# Patient Record
Sex: FEMALE | Race: WHITE | Employment: FULL TIME | URBAN - METROPOLITAN AREA
[De-identification: names, ages, dates, MRNs, and addresses within clinical notes are randomized per-mention and may not be internally consistent; named-entity substitution may affect disease eponyms.]

---

## 2018-06-26 ENCOUNTER — OFFICE VISIT (OUTPATIENT)
Dept: OBGYN CLINIC | Age: 23
End: 2018-06-26

## 2018-06-26 VITALS
WEIGHT: 124 LBS | SYSTOLIC BLOOD PRESSURE: 102 MMHG | BODY MASS INDEX: 22.82 KG/M2 | HEIGHT: 62 IN | DIASTOLIC BLOOD PRESSURE: 70 MMHG

## 2018-06-26 DIAGNOSIS — Z11.3 SCREENING FOR VENEREAL DISEASE: ICD-10-CM

## 2018-06-26 DIAGNOSIS — N64.4 BREAST PAIN, RIGHT: ICD-10-CM

## 2018-06-26 DIAGNOSIS — Z01.419 ENCOUNTER FOR GYNECOLOGICAL EXAMINATION (GENERAL) (ROUTINE) WITHOUT ABNORMAL FINDINGS: Primary | ICD-10-CM

## 2018-06-26 NOTE — PROGRESS NOTES
[address] [facility]  [email or web address]  [phone]    Umang Patricio MD, FACOG       Annual Gynecologic Exam:  [facility] <40  Chief Complaint   Patient presents with    Well Woman    Breast pain     right         Matt Marquez is a 21 y.o.  WHITE OR  female who presents for an annual well woman exam.  Patient's last menstrual period was 2018 (exact date). .    With regard to the Gardisil vaccine, she declines to receive it. She does not report additional concerns today. Pt c/o right breast pain, persistent x 2-3 mos.  +caffeine 5 cups coffee per day. No FH breast cancer or previous breast problems. Menstrual status:  Her periods are moderate. She does not report dysmenorrhea/painful menses. She does not report irregular bleeding. Sexual history and Contraception:  History   Sexual Activity    Sexual activity: Yes    Partners: Male    Birth control/ protection: Condom     She always use condoms with sexual activity  She does reports new sexual partner(s) in the last year. The patient does not request STD testing. We recommended testing per CDC guidelines and at patient request.     Preventive Medicine History:  Her most recent Pap smear result: normal was obtained in 2016  Her most recent HR HPV screen was N/A. She does not have a history of FAY 2, 3 or cervical cancer. Past Medical History:   Diagnosis Date    Pap smear for cervical cancer screening 2016    Negative     OB History    Para Term  AB Living   0 0 0 0 0 0   SAB TAB Ectopic Molar Multiple Live Births   0 0 0  0            No past surgical history on file.   Family History   Problem Relation Age of Onset    Diabetes Father     Hypertension Father     No Known Problems Mother      Social History     Social History    Marital status: SINGLE     Spouse name: N/A    Number of children: N/A    Years of education: N/A     Occupational History    Not on file.     Social History Main Topics    Smoking status: Never Smoker    Smokeless tobacco: Not on file    Alcohol use No    Drug use: No    Sexual activity: Yes     Partners: Male     Birth control/ protection: Condom     Other Topics Concern    Not on file     Social History Narrative       No Known Allergies    No current outpatient prescriptions on file. No current facility-administered medications for this visit.         Patient Active Problem List   Diagnosis Code    Dysmenorrhea N94.6       Review of Systems - History obtained from the patient  Constitutional: negative for weight loss, fever, night sweats  HEENT: negative for hearing loss, earache, congestion, snoring, sorethroat  CV: negative for chest pain, palpitations, edema  Resp: negative for cough, shortness of breath, wheezing  GI: negative for change in bowel habits, abdominal pain, black or bloody stools  : negative for frequency, dysuria, hematuria  GYN: see HPI  MSK: negative for back pain, joint pain, muscle pain  Breast: see hpi  Skin :negative for itching, rash, hives  Neuro: negative for dizziness, headache, confusion, weakness  Psych: negative for anxiety, depression, change in mood  Heme/lymph: negative for bleeding, bruising, pallor    Physical Exam  Visit Vitals    /70    Ht 5' 2\" (1.575 m)    Wt 124 lb (56.2 kg)    LMP 05/31/2018 (Exact Date)    BMI 22.68 kg/m2       Constitutional  · Appearance: well-nourished, well developed, alert, in no acute distress    HENT  · Head and Face: appears normal    Neck  · Inspection/Palpation: normal appearance, no masses or tenderness  · Lymph Nodes: no lymphadenopathy present  · Thyroid: gland size normal, nontender, no nodules or masses present on palpation    Chest  · Respiratory Effort: breathing unlabored  · Auscultation: normal breath sounds    Cardiovascular  · Heart:  · Auscultation: regular rate and rhythm without murmur    Breasts  · Inspection of Breasts: breasts symmetrical, no skin changes, no discharge present, nipple appearance normal, no skin retraction present  · Palpation of Breasts and Axillae: no masses present on palpation, no breast tenderness  · Axillary Lymph Nodes: no lymphadenopathy present    Gastrointestinal  · Abdominal Examination: abdomen non-tender to palpation, normal bowel sounds, no masses present  · Liver and spleen: no hepatomegaly present, spleen not palpable  · Hernias: no hernias identified    Genitourinary  · External Genitalia: normal appearance for age, no discharge present, no tenderness present, no inflammatory lesions present, no masses present  · Vagina: normal vaginal vault without central or paravaginal defects, no discharge present, no inflammatory lesions present, no masses present  · Bladder: non-tender to palpation  · Urethra: appears normal  · Cervix: normal   · Uterus: normal size, shape and consistency  · Adnexa: no adnexal tenderness present, no adnexal masses present  · Perineum: perineum within normal limits, no evidence of trauma, no rashes or skin lesions present  · Anus: anus within normal limits, no hemorrhoids present  · Inguinal Lymph Nodes: no lymphadenopathy present    Skin  · General Inspection: no rash, no lesions identified    Neurologic/Psychiatric  · Mental Status:  · Orientation: grossly oriented to person, place and time  · Mood and Affect: mood normal, affect appropriate    Assessment:  21 y.o.  for well woman exam  Encounter Diagnoses   Name Primary?  Encounter for gynecological examination (general) (routine) without abnormal findings Yes    Screening for venereal disease     Breast pain, right        Plan:  The patient was counseled about diet, exercise, healthy lifestyle  We discussed self breast exam  We discussed safer sex practices, condom use and risk factors for sexually transmitted diseases. We discussed current pap smear and HR HPV testing guidelines.    We recommend follow up one year for routine annual gynecologic exam or sooner prn  We recommend routine follow up with her primary care doctor for management of chronic medical problems and non-gynecologic concerns  Handouts were given to the patient  We discussed calcium/vitamin D/weight bearing exercise and osteoporosis prevention  Breast us  Disc options for breast pain,   Fu if ni    Folllow up:  [x] return for annual well woman exam in one year or sooner if she is having problems  [] follow up and ultrasound  [] 6 months  [] 3 months  [] 6 weeks   [] 1 month    Orders Placed This Encounter    US BREAST RT COMPLETE 4 QUAD    CHLAMYDIA/GC PCR       No results found for any visits on 06/26/18.

## 2018-06-26 NOTE — MR AVS SNAPSHOT
[address] [facility] [address] [place] [phone] Patient: Sherrie Dunn MRN: [id number] :1995 Visit Information Date & Time Provider Department Dept. Phone Encounter #  
 2018  8:40 AM Collette Bair, MD [facility] [phone] [id number] Upcoming Health Maintenance Date Due  
 HPV Age 9Y-34Y (1 of 1 - Female 3 Dose Series) 2006 Influenza Age 5 to Adult 2018 PAP AKA CERVICAL CYTOLOGY 2019 Allergies as of 2018  Review Complete On: 2018 By: Hai Ocampo LPN No Known Allergies Current Immunizations  Never Reviewed No immunizations on file. Not reviewed this visit Vitals BP Height(growth percentile) Weight(growth percentile) LMP BMI OB Status 102/70 5' 2\" (1.575 m) 124 lb (56.2 kg) 2018 (Exact Date) 22.68 kg/m2 Having regular periods Smoking Status Never Smoker BMI and BSA Data Body Mass Index Body Surface Area  
 22.68 kg/m 2 1.57 m 2 Preferred Pharmacy Pharmacy Name Phone [id number] [address] [facility] [phone] Your Updated Medication List  
  
   
This list is accurate as of 18  8:57 AM.  Always use your most recent med list.  
  
  
  
  
 norethindrone-ethinyl estradiol 1 mg-20 mcg (21)/75 mg (7) Tab Commonly known as:  Marika SCHROEDER  Take 1 Tab by mouth daily. Patient Instructions Well Visit, Ages 25 to 48: Care Instructions Your Care Instructions Physical exams can help you stay healthy. Your doctor has checked your overall health and may have suggested ways to take good care of yourself. He or she also may have recommended tests. At home, you can help prevent illness with healthy eating, regular exercise, and other steps. Follow-up care is a key part of your treatment and safety. Be sure to make and go to all appointments, and call your doctor if you are having problems. It's also a good idea to know your test results and keep a list of the medicines you take. How can you care for yourself at home? · Reach and stay at a healthy weight. This will lower your risk for many problems, such as obesity, diabetes, heart disease, and high blood pressure. · Get at least 30 minutes of physical activity on most days of the week. Walking is a good choice. You also may want to do other activities, such as running, swimming, cycling, or playing tennis or team sports. Discuss any changes in your exercise program with your doctor. · Do not smoke or allow others to smoke around you. If you need help quitting, talk to your doctor about stop-smoking programs and medicines. These can increase your chances of quitting for good. · Talk to your doctor about whether you have any risk factors for sexually transmitted infections (STIs). Having one sex partner (who does not have STIs and does not have sex with anyone else) is a good way to avoid these infections. · Use birth control if you do not want to have children at this time. Talk with your doctor about the choices available and what might be best for you. · Protect your skin from too much sun. When you're outdoors from 10 a.m. to 4 p.m., stay in the shade or cover up with clothing and a hat with a wide brim. Wear sunglasses that block UV rays. Even when it's cloudy, put broad-spectrum sunscreen (SPF 30 or higher) on any exposed skin. · See a dentist one or two times a year for checkups and to have your teeth cleaned. · Wear a seat belt in the car. · Drink alcohol in moderation, if at all. That means no more than 2 drinks a day for men and 1 drink a day for women. Follow your doctor's advice about when to have certain tests. These tests can spot problems early. For everyone · Cholesterol. Have the fat (cholesterol) in your blood tested after age 21. Your doctor will tell you how often to have this done based on your age, family history, or other things that can increase your risk for heart disease. · Blood pressure. Have your blood pressure checked during a routine doctor visit. Your doctor will tell you how often to check your blood pressure based on your age, your blood pressure results, and other factors. · Vision. Talk with your doctor about how often to have a glaucoma test. 
· Diabetes. Ask your doctor whether you should have tests for diabetes. · Colon cancer. Have a test for colon cancer at age 48. You may have one of several tests. If you are younger than 48, you may need a test earlier if you have any risk factors. Risk factors include whether you already had a precancerous polyp removed from your colon or whether your parent, brother, sister, or child has had colon cancer. For women · Breast exam and mammogram. Talk to your doctor about when you should have a clinical breast exam and a mammogram. Medical experts differ on whether and how often women under 50 should have these tests. Your doctor can help you decide what is right for you. · Pap test and pelvic exam. Begin Pap tests at age 24. A Pap test is the best way to find cervical cancer. The test often is part of a pelvic exam. Ask how often to have this test. 
· Tests for sexually transmitted infections (STIs). Ask whether you should have tests for STIs. You may be at risk if you have sex with more than one person, especially if your partners do not wear condoms. For men · Tests for sexually transmitted infections (STIs). Ask whether you should have tests for STIs. You may be at risk if you have sex with more than one person, especially if you do not wear a condom. · Testicular cancer exam. Ask your doctor whether you should check your testicles regularly. · Prostate exam. Talk to your doctor about whether you should have a blood test (called a PSA test) for prostate cancer. Experts differ on whether and when men should have this test. Some experts suggest it if you are older than 39 and are -American or have a father or brother who got prostate cancer when he was younger than 72. When should you call for help? Watch closely for changes in your health, and be sure to contact your doctor if you have any problems or symptoms that concern you. Where can you learn more? Go to http://bertrand-ollie.info/. Enter P072 in the search box to learn more about \"Well Visit, Ages 25 to 48: Care Instructions. \" Current as of: May 12, 2017 Content Version: 11.4 © 2867-9331 [employer]. Care instructions adapted under license by [employer] (which disclaims liability or warranty for this information). If you have questions about a medical condition or this instruction, always ask your healthcare professional. [place] [address] any warranty or liability for your use of this information. Introducing \[facility] [place] [facility]\"" & HEALTH SERVICES! Dear Naya Isbell: 
Thank you for requesting a bizsol account. Our records indicate that you already have an active bizsol account. You can access your account anytime at [email or web address] Did you know that you can access your hospital and ER discharge instructions at any time in bizsol? You can also review all of your test results from your hospital stay or ER visit. Additional Information If you have questions, please visit the Frequently Asked Questions section of the bizsol website at [email or web address]. Remember, bizsol is NOT to be used for urgent needs. For medical emergencies, dial 911. Now available from your iPhone and Android! Please provide this summary of care documentation to your next provider. If you have any questions after today's visit, please call [phone].

## 2018-06-26 NOTE — PATIENT INSTRUCTIONS

## 2018-06-28 LAB
C TRACH RRNA SPEC QL NAA+PROBE: NEGATIVE
N GONORRHOEA RRNA SPEC QL NAA+PROBE: NEGATIVE

## 2018-06-29 ENCOUNTER — HOSPITAL ENCOUNTER (OUTPATIENT)
Dept: MAMMOGRAPHY | Age: 23
Discharge: HOME | End: 2018-06-29
Attending: OBSTETRICS & GYNECOLOGY
Payer: COMMERCIAL

## 2018-06-29 DIAGNOSIS — N64.4 BREAST PAIN, RIGHT: ICD-10-CM

## 2018-06-29 PROCEDURE — 76642 ULTRASOUND BREAST LIMITED: CPT

## 2018-07-03 NOTE — PROGRESS NOTES
The results are normal.   Please notify patient. Recommend f/u if still having symptoms/problems or has additional concerns.   Breast US wnl  rec breast fu 2-3 mos check sx

## 2018-07-18 ENCOUNTER — TELEPHONE (OUTPATIENT)
Dept: OBGYN CLINIC | Age: 23
End: 2018-07-18

## 2018-07-18 NOTE — TELEPHONE ENCOUNTER
Notes Recorded by Kenia Knight on 7/18/2018 at 12:13 PM  Left message on machine for patient to call the office or to respond to the [facility] message sent regarding normal breast ultrasound.  ------    Notes Recorded by Alda Moss MD on 7/3/2018 at 6:07 PM  The results are normal.   Please notify patient. Recommend f/u if still having symptoms/problems or has additional concerns.   Breast US wnl  rec breast fu 2-3 mos check sx      Pt retuning call and was notified with verbal understanding she will call back to schedule an appt

## 2018-07-18 NOTE — PROGRESS NOTES
Left message on machine for patient to call the office or to respond to the [employer] message sent regarding normal breast ultrasound.

## 2018-12-22 ENCOUNTER — APPOINTMENT (OUTPATIENT)
Dept: CT IMAGING | Age: 23
End: 2018-12-22
Attending: STUDENT IN AN ORGANIZED HEALTH CARE EDUCATION/TRAINING PROGRAM
Payer: COMMERCIAL

## 2018-12-22 ENCOUNTER — HOSPITAL ENCOUNTER (EMERGENCY)
Age: 23
Discharge: HOME | End: 2018-12-22
Attending: EMERGENCY MEDICINE
Payer: COMMERCIAL

## 2018-12-22 VITALS
DIASTOLIC BLOOD PRESSURE: 72 MMHG | SYSTOLIC BLOOD PRESSURE: 117 MMHG | OXYGEN SATURATION: 100 % | BODY MASS INDEX: 20.38 KG/M2 | RESPIRATION RATE: 18 BRPM | TEMPERATURE: 98.4 F | HEIGHT: 63 IN | HEART RATE: 89 BPM | WEIGHT: 115 LBS

## 2018-12-22 DIAGNOSIS — S16.1XXA STRAIN OF NECK MUSCLE, INITIAL ENCOUNTER: ICD-10-CM

## 2018-12-22 DIAGNOSIS — S06.0X0A CONCUSSION WITHOUT LOSS OF CONSCIOUSNESS, INITIAL ENCOUNTER: Primary | ICD-10-CM

## 2018-12-22 PROCEDURE — 70450 CT HEAD/BRAIN W/O DYE: CPT

## 2018-12-22 PROCEDURE — 99282 EMERGENCY DEPT VISIT SF MDM: CPT

## 2018-12-22 PROCEDURE — 74011250637 HC RX REV CODE- 250/637: Performed by: STUDENT IN AN ORGANIZED HEALTH CARE EDUCATION/TRAINING PROGRAM

## 2018-12-22 RX ORDER — BUTALBITAL, ACETAMINOPHEN AND CAFFEINE 50; 325; 40 MG/1; MG/1; MG/1
1 TABLET ORAL ONCE
Status: COMPLETED | OUTPATIENT
Start: 2018-12-22 | End: 2018-12-22

## 2018-12-22 RX ADMIN — BUTALBITAL, ACETAMINOPHEN AND CAFFEINE 1 TABLET: 50; 325; 40 TABLET ORAL at 22:44
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
## 2018-12-23 NOTE — DISCHARGE INSTRUCTIONS
Concussion: Care Instructions  Your Care Instructions    A concussion is a kind of injury to the brain. It happens when the head receives a hard blow. The impact can jar or shake the brain against the skull. This interrupts the brain's normal activities. Although you may have cuts or bruises on your head or face, you may have no other visible signs of a brain injury. In most cases, damage to the brain from a concussion can't be seen in tests such as a CT or MRI scan. For a few weeks, you may have low energy, dizziness, trouble sleeping, a headache, ringing in your ears, or nausea. You may also feel anxious, grumpy, or depressed. You may have problems with memory and concentration. These symptoms are common after a concussion. They should slowly improve over time. Sometimes this takes weeks or even months. Someone who lives with you should know how to care for you. Please share this and all information with a caregiver who will be available to help if needed. Follow-up care is a key part of your treatment and safety. Be sure to make and go to all appointments, and call your doctor if you are having problems. It's also a good idea to know your test results and keep a list of the medicines you take. How can you care for yourself at home? Pain control  · Put ice or a cold pack on the part of your head that hurts for 10 to 20 minutes at a time. Put a thin cloth between the ice and your skin. · Be safe with medicines. Read and follow all instructions on the label. ? If the doctor gave you a prescription medicine for pain, take it as prescribed. ? If you are not taking a prescription pain medicine, ask your doctor if you can take an over-the-counter medicine. Recovery  · Follow your doctor's instructions. He or she will tell you if you need someone to watch you closely for the next 24 hours or longer. · Rest is the best way to recover from a concussion.  You need to rest your body and your brain:  ? Get plenty of sleep at night. And take rest breaks during the day. ? Avoid activities that take a lot of physical or mental work. This includes housework, exercise, schoolwork, video games, text messaging, and using the computer. ? You may need to change your school or work schedule while you recover. ? Return to your normal activities slowly. Do not try to do too much at once. · Do not drink alcohol or use illegal drugs. Alcohol and illegal drugs can slow your recovery. And they can increase your risk of a second brain injury. · Avoid activities that could lead to another concussion. Follow your doctor's instructions for a gradual return to activity and sports. · Ask your doctor when it's okay for you to drive a car, ride a bike, or operate machinery. How should you return to activity? Your return to activity can begin after 1 to 2 days of physical and mental rest. After resting, you can gradually increase your activity as long as it does not cause new symptoms or worsen your symptoms. Doctors and concussion specialists suggest steps to follow for returning to sports after a concussion. Use these steps as a guide. You should slowly progress through the following levels of activity:  1. Limited activity. You can take part in daily activities as long as the activity doesn't increase your symptoms or cause new symptoms. 2. Light aerobic activity. This can include walking, swimming, or other exercise at less than 70% of maximum heart rate. No resistance training is included in this step. 3. Sport-specific exercise. This includes running drills or skating drills (depending on the sport), but no head impact. 4. Noncontact training drills. This includes more complex training drills such as passing. The athlete may also begin light resistance training. 5. Full-contact practice. The athlete can participate in normal training. 6. Return to normal game play.  This is the final step and allows the athlete to join in normal game play. Watch and keep track of your progress. It should take at least 6 days for you to go from light activity to normal game play. Make sure that you can stay at each new level of activity for at least 24 hours without symptoms, or as long as your doctor says, before doing more. If one or more symptoms come back, return to a lower level of activity for at least 24 hours. Don't move on until all symptoms are gone. When should you call for help? Call 911 anytime you think you may need emergency care. For example, call if:    · You have a seizure.     · You passed out (lost consciousness).     · You are confused or can't stay awake.    Call your doctor now or seek immediate medical care if:    · You have new or worse vomiting.     · You feel less alert.     · You have new weakness or numbness in any part of your body.    Watch closely for changes in your health, and be sure to contact your doctor if:    · You do not get better as expected.     · You have new symptoms, such as headaches, trouble concentrating, or changes in mood. Where can you learn more? Go to http://bertrand-ollie.info/. Enter Z317 in the search box to learn more about \"Concussion: Care Instructions. \"  Current as of: September 10, 2017  Content Version: 11.8  © 8077-0827 [employer], [employer]. Care instructions adapted under license by [employer] (which disclaims liability or warranty for this information). If you have questions about a medical condition or this instruction, always ask your healthcare professional. [place] [address] any warranty or liability for your use of this information. Seen and examined . D/W Nurse and AId from [facility]. Neurology consult noted.
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
## 2021-02-23 ENCOUNTER — OFFICE VISIT (OUTPATIENT)
Dept: OBGYN CLINIC | Age: 26
End: 2021-02-23
Payer: COMMERCIAL

## 2021-02-23 VITALS
DIASTOLIC BLOOD PRESSURE: 88 MMHG | BODY MASS INDEX: 24.8 KG/M2 | SYSTOLIC BLOOD PRESSURE: 127 MMHG | HEIGHT: 63 IN | WEIGHT: 140 LBS | HEART RATE: 104 BPM

## 2021-02-23 DIAGNOSIS — Z01.419 ENCOUNTER FOR GYNECOLOGICAL EXAMINATION (GENERAL) (ROUTINE) WITHOUT ABNORMAL FINDINGS: ICD-10-CM

## 2021-02-23 DIAGNOSIS — Z12.4 ENCOUNTER FOR PAPANICOLAOU SMEAR FOR CERVICAL CANCER SCREENING: Primary | ICD-10-CM

## 2021-02-23 DIAGNOSIS — N94.6 DYSMENORRHEA: ICD-10-CM

## 2021-02-23 PROCEDURE — 99395 PREV VISIT EST AGE 18-39: CPT | Performed by: OBSTETRICS & GYNECOLOGY

## 2021-02-23 NOTE — PROGRESS NOTES
[facility]  [email or web address]  [phone]    Symone Vargas MD, FACOG       Annual Gynecologic Exam:  [facility] <40  Chief Complaint   Patient presents with    Well Woman         Concepción Anderson is a 22 y.o.  WHITE female who presents for an annual well woman exam.  Patient's last menstrual period was 01/15/2021 (exact date). .    She reports the following additional concerns: Wants information about IUD. Menstrual status:  She does report dysmenorrhea/painful menses. She does report heavy menses. She does not report irregular bleeding. Sexual history and Contraception:  Social History     Substance and Sexual Activity   Sexual Activity Not Currently    Partners: Male    Birth control/protection: Condom       She does reports new sexual partner(s) in the last year. Preventive Medicine History:  Her most recent Pap smear result: normal was obtained in 2016  Her most recent HR HPV screen was never obtained. She does not have a history of FAY 2, 3 or cervical cancer. Past Medical History:   Diagnosis Date    Pap smear for cervical cancer screening 2016    Negative     OB History    Para Term  AB Living   0 0 0 0 0 0   SAB TAB Ectopic Molar Multiple Live Births   0 0 0   0       History reviewed. No pertinent surgical history.   Family History   Problem Relation Age of Onset    Diabetes Father     Hypertension Father     No Known Problems Mother      Social History     Socioeconomic History    Marital status: SINGLE     Spouse name: Not on file    Number of children: Not on file    Years of education: Not on file    Highest education level: Not on file   Occupational History    Not on file   Social Needs    Financial resource strain: Not on file    Food insecurity     Worry: Not on file     Inability: Not on file    Transportation needs     Medical: Not on file     Non-medical: Not on file   Tobacco Use    Smoking status: Never Smoker    Smokeless tobacco: Never Used   Substance and Sexual Activity    Alcohol use: Yes     Alcohol/week: 1.0 standard drinks     Types: 1 Glasses of wine per week    Drug use: No    Sexual activity: Not Currently     Partners: Male     Birth control/protection: Condom   Lifestyle    Physical activity     Days per week: Not on file     Minutes per session: Not on file    Stress: Not on file   Relationships    Social connections     Talks on phone: Not on file     Gets together: Not on file     Attends Sabianism service: Not on file     Active member of club or organization: Not on file     Attends meetings of clubs or organizations: Not on file     Relationship status: Not on file    Intimate partner violence     Fear of current or ex partner: Not on file     Emotionally abused: Not on file     Physically abused: Not on file     Forced sexual activity: Not on file   Other Topics Concern    Not on file   Social History Narrative    Not on file       No Known Allergies    No current outpatient medications on file. No current facility-administered medications for this visit.         Patient Active Problem List   Diagnosis Code    Dysmenorrhea N94.6         Review of Systems - History obtained from the patient and patient filled out questionnaire   Constitutional/general, HEENT, CV, Resp, GI, MSK, Neuro, Psych, Heme/lymph, Skin, Breast ROS: no significant complaints except as noted on HPI    Physical Exam  Visit Vitals  /88 (BP 1 Location: Right arm, BP Patient Position: Sitting, BP Cuff Size: Adult)   Pulse (!) 104   Ht 5' 3\" (1.6 m)   Wt 140 lb (63.5 kg)   LMP 01/15/2021 (Exact Date)   BMI 24.80 kg/m²       Constitutional  · Appearance: well-nourished, well developed, alert, in no acute distress    HENT  · Head and Face: appears normal    Neck  · Inspection/Palpation: normal appearance, no masses or tenderness  · Lymph Nodes: no lymphadenopathy present  · Thyroid: gland size normal, nontender, no nodules or masses present on palpation    Chest  · Respiratory Effort: breathing unlabored  · Auscultation: normal breath sounds    Cardiovascular  · Heart:  · Auscultation: regular rate and rhythm without murmur    Breasts  · Inspection of Breasts: breasts symmetrical, no skin changes, no discharge present, nipple appearance normal, no skin retraction present  · Palpation of Breasts and Axillae: no masses present on palpation, no breast tenderness  · Axillary Lymph Nodes: no lymphadenopathy present    Gastrointestinal  · Abdominal Examination: abdomen non-tender to palpation, normal bowel sounds, no masses present  · Liver and spleen: no hepatomegaly present, spleen not palpable  · Hernias: no hernias identified    Genitourinary  · External Genitalia: normal appearance for age, no discharge present, no tenderness present, no inflammatory lesions present, no masses present  · Vagina: normal vaginal vault without central or paravaginal defects, no discharge present, no inflammatory lesions present, no masses present  · Bladder: non-tender to palpation  · Urethra: appears normal  · Cervix: normal   · Uterus: normal size, shape and consistency  · Adnexa: no adnexal tenderness present, no adnexal masses present  · Perineum: perineum within normal limits, no evidence of trauma, no rashes or skin lesions present  · Anus: anus within normal limits, no hemorrhoids present  · Inguinal Lymph Nodes: no lymphadenopathy present    Skin  · General Inspection: no rash, no lesions identified    Neurologic/Psychiatric  · Mental Status:  · Orientation: grossly oriented to person, place and time  · Mood and Affect: mood normal, affect appropriate    Assessment:  22 y.o.  for well woman exam  Encounter Diagnoses   Name Primary?     Encounter for Papanicolaou smear for cervical cancer screening Yes    Encounter for gynecological examination (general) (routine) without abnormal findings     Dysmenorrhea Plan:  The patient was counseled about diet, exercise, healthy lifestyle  We discussed current pap smear and HR HPV testing guidelines. I recommended follow up one year for routine annual gynecologic exam or sooner prn  Handouts were given to the patient  I recommended follow up with a primary care physician for chronic medical problems and evaluation of non-gynecologic concerns and to please contact our office with any GYN questions or concerns. I recommended testing per CDC guidelines and at patient request.   It is preferred to place at the beginning of cycle. Confirm AE up to date, pap up to date, no unprotected intercourse x 2 weeks prior to visit, and that she has checked with her insurance about coverage. Plan [employer] up:  [x] return for annual well woman exam in one year or sooner if she is having problems  [] follow up and ultrasound  [] 6 months  [] 3 months  [] 6 weeks   [] 1 month    Orders Placed This Encounter    PAP IG, CT-NG, RFX APTIMA HPV ASCUS ([id number], [id number])       No results found for any visits on 02/23/21.

## 2021-02-23 NOTE — PATIENT INSTRUCTIONS
Learning About Birth Control: Intrauterine Device (IUD) What is an intrauterine device (IUD)? The intrauterine device (IUD) is used to prevent pregnancy. It's a small, plastic, T-shaped device. Your doctor places the IUD in your uterus. If you and your doctor discuss it before you give birth, this can be done right after you have your baby. You have a choice between a hormonal IUD and a copper IUD. The hormonal IUD can prevent pregnancy for 3 to 6 years, depending on which IUD is used. But your doctor may talk to you about leaving it in for longer. Once you have it, you don't have to do anything else to prevent pregnancy. The copper IUD can prevent pregnancy for 10 years. But your doctor may talk to you about leaving it in for longer. Once you have it, you don't have to do anything else to prevent pregnancy. A string tied to the end of the IUD hangs down through the opening of the uterus (called the cervix) into the vagina. You can check that the IUD is in place by feeling for the string. The IUD usually stays in the uterus until your doctor removes it. How well does it work? In the first year of use: · When the hormonal IUD is used exactly as directed, fewer than 1 woman out of 100 has an unplanned pregnancy. · When the copper IUD is used exactly as directed, fewer than 1 woman out of 100 has an unplanned pregnancy. Be sure to tell your doctor about any health problems you have or medicines you take. He or she can help you choose the birth control method that is right for you. What are the advantages of an IUD? · An IUD is one of the most effective methods of birth control. · It prevents pregnancy for 3 to 10 years, depending on the type. You don't have to worry about birth control during this time. · It's safe to use while breastfeeding. · IUDs don't contain estrogen. So you can use an IUD if you don't want to take estrogen or can't take estrogen because you have certain health problems or concerns. · An IUD is convenient. It is always providing birth control. You don't need to remember to take a pill or get a shot. You don't have to interrupt sex to protect against pregnancy. · A hormonal IUD may reduce heavy bleeding and cramping. What are the disadvantages of an IUD? · An IUD doesn't protect against sexually transmitted infections (STIs), such as herpes or HIV/AIDS. If you aren't sure if your sex partner might have an STI, use a condom to protect against disease. · A copper IUD may cause periods with more bleeding and cramping. · You have to see a doctor to have an IUD inserted and removed. Where can you learn more? Go to http://www.gray.com/ Enter S084 in the search box to learn more about \"Learning About Birth Control: Intrauterine Device (IUD). \" Current as of: February 11, 2020               Content Version: 12.6 © 1660-3573 [employer]. Care instructions adapted under license by [employer] (which disclaims liability or warranty for this information). If you have questions about a medical condition or this instruction, always ask your healthcare professional. [place] [address] any warranty or liability for your use of this information. Well Visit, Ages 25 to 48: Care Instructions Your Care Instructions Physical exams can help you stay healthy. Your doctor has checked your overall health and may have suggested ways to take good care of yourself. He or she also may have recommended tests. At home, you can help prevent illness with healthy eating, regular exercise, and other steps. Follow-up care is a key part of your treatment and safety. Be sure to make and go to all appointments, and call your doctor if you are having problems. It's also a good idea to know your test results and keep a list of the medicines you take. How can you care for yourself at home? · Reach and stay at a healthy weight. This will lower your risk for many problems, such as obesity, diabetes, heart disease, and high blood pressure. · Get at least 30 minutes of physical activity on most days of the week. Walking is a good choice. You also may want to do other activities, such as running, swimming, cycling, or playing tennis or team sports. Discuss any changes in your exercise program with your doctor. · Do not smoke or allow others to smoke around you. If you need help quitting, talk to your doctor about stop-smoking programs and medicines. These can increase your chances of quitting for good. · Talk to your doctor about whether you have any risk factors for sexually transmitted infections (STIs). Having one sex partner (who does not have STIs and does not have sex with anyone else) is a good way to avoid these infections. · Use birth control if you do not want to have children at this time. Talk with your doctor about the choices available and what might be best for you. · Protect your skin from too much sun. When you're outdoors from 10 a.m. to 4 p.m., stay in the shade or cover up with clothing and a hat with a wide brim. Wear sunglasses that block UV rays. Even when it's cloudy, put broad-spectrum sunscreen (SPF 30 or higher) on any exposed skin. · See a dentist one or two times a year for checkups and to have your teeth cleaned. · Wear a seat belt in the car. Follow your doctor's advice about when to have certain tests. These tests can spot problems early. For everyone · Cholesterol. Have the fat (cholesterol) in your blood tested after age 21. Your doctor will tell you how often to have this done based on your age, family history, or other things that can increase your risk for heart disease. · Blood pressure. Have your blood pressure checked during a routine doctor visit. Your doctor will tell you how often to check your blood pressure based on your age, your blood pressure results, and other factors. · Vision. Talk with your doctor about how often to have a glaucoma test. 
· Diabetes. Ask your doctor whether you should have tests for diabetes. · Colon cancer. Your risk for colorectal cancer gets higher as you get older. Some experts say that adults should start regular screening at age 48 and stop at age 76. Others say to start before age 48 or continue after age 76. Talk with your doctor about your risk and when to start and stop screening. For women · Breast exam and mammogram. Talk to your doctor about when you should have a clinical breast exam and a mammogram. Medical experts differ on whether and how often women under 50 should have these tests. Your doctor can help you decide what is right for you. · Cervical cancer screening test and pelvic exam. Begin with a Pap test at age 24. The test often is part of a pelvic exam. Starting at age 27, you may choose to have a Pap test, an HPV test, or both tests at the same time (called co-testing). Talk with your doctor about how often to have testing. · Tests for sexually transmitted infections (STIs). Ask whether you should have tests for STIs. You may be at risk if you have sex with more than one person, especially if your partners do not wear condoms. For men · Tests for sexually transmitted infections (STIs). Ask whether you should have tests for STIs. You may be at risk if you have sex with more than one person, especially if you do not wear a condom. · Testicular cancer exam. Ask your doctor whether you should check your testicles regularly. · Prostate exam. Talk to your doctor about whether you should have a blood test (called a PSA test) for prostate cancer. Experts differ on whether and when men should have this test. Some experts suggest it if you are older than 39 and are -American or have a father or brother who got prostate cancer when he was younger than 72. When should you call for help? Watch closely for changes in your health, and be sure to contact your doctor if you have any problems or symptoms that concern you. Where can you learn more? Go to http://www.armenta.com/ Enter P072 in the search box to learn more about \"Well Visit, Ages 25 to 48: Care Instructions. \" Current as of: May 27, 2020               Content Version: 12.6 © 6801-2064 [employer]. Care instructions adapted under license by [employer] (which disclaims liability or warranty for this information). If you have questions about a medical condition or this instruction, always ask your healthcare professional. [address] any warranty or liability for your use of this information.

## 2021-02-26 LAB
C TRACH RRNA CVX QL NAA+PROBE: NEGATIVE
CYTOLOGIST CVX/VAG CYTO: NORMAL
CYTOLOGY CVX/VAG DOC CYTO: NORMAL
CYTOLOGY CVX/VAG DOC THIN PREP: NORMAL
CYTOLOGY HISTORY:: NORMAL
DX ICD CODE: NORMAL
LABCORP, 190119: NORMAL
Lab: NORMAL
Lab: NORMAL
N GONORRHOEA RRNA CVX QL NAA+PROBE: NEGATIVE
OTHER STN SPEC: NORMAL
STAT OF ADQ CVX/VAG CYTO-IMP: NORMAL

## 2021-02-26 NOTE — PROGRESS NOTES
Normal pap smear, message sent if [address] active. Update PMH/HM: include: Date of pap, Cytology: wnl. For HR HPV results: list NEG or POS, when done.

## 2021-03-18 ENCOUNTER — OFFICE VISIT (OUTPATIENT)
Dept: OBGYN CLINIC | Age: 26
End: 2021-03-18
Payer: COMMERCIAL

## 2021-03-18 VITALS — BODY MASS INDEX: 24.8 KG/M2 | HEIGHT: 63 IN

## 2021-03-18 DIAGNOSIS — Z30.430 ENCOUNTER FOR IUD INSERTION: Primary | ICD-10-CM

## 2021-03-18 DIAGNOSIS — Z76.89 ENCOUNTER FOR MENSTRUAL REGULATION: ICD-10-CM

## 2021-03-18 LAB
HCG URINE, QL. (POC): NEGATIVE
VALID INTERNAL CONTROL?: YES

## 2021-03-18 PROCEDURE — 81025 URINE PREGNANCY TEST: CPT | Performed by: OBSTETRICS & GYNECOLOGY

## 2021-03-18 PROCEDURE — 58300 INSERT INTRAUTERINE DEVICE: CPT | Performed by: OBSTETRICS & GYNECOLOGY

## 2021-03-18 NOTE — PROGRESS NOTES
[address] [place] [address] OB-GYN  [email or web address]/  [phone]    Tad Yao MD, [facility] IUD INSERTION  OFFICE PROCEDURE PROGRESS NOTE    [facility]  OFFICE PROCEDURE PROGRESS NOTE    Chart reviewed for the following:   Aaliyah VOGEL MD, have reviewed the History, Physical and updated the Allergic reactions for [address] performed immediately prior to start of procedure:   Aaliyah VOGEL MD, have performed the following reviews on Henrik Hernandez prior to the start of the procedure:            * Patient was identified by name and date of birth   * Agreement on procedure being performed was verified  * Risks and Benefits explained to the patient  * Procedure site verified and marked as necessary  * Patient was positioned for comfort  * Consent was signed and verified     Time: 3:25pm    Date of procedure: 3/18/2021    Procedure performed by: Aaliyah Hernández MD    Provider assisted by:   Sergei Holman. Patient assisted by: self    How tolerated by patient: tolerated the procedure well with no complications    Post Procedural Pain Scale: 0 - No Hurt    Comments: none        IUD INSERTION  Indications:  Henrik Hernandez is a [de-identified] P5,  22 y.o. female WHITE     LMP: Patient's last menstrual period was 03/17/2021 (exact date). , normal    She has a menstrual history positive for painful & heavy cycles. She  presents for insertion of an IUD. The risks, benefits and alternatives of IUD insertion were discussed in detail and all questions were answered. The patient has reviewed the IUD  provided information and consent. She has elected to proceed with the insertion today and she states she has no further questions. A urine pregnancy test was Negative. Procedure: On bimanual exam the uterus was anteverted and normal in size with no tenderness present.  A speculum was inserted into the vagina and the cervix was visualized. The cervix was prepped with zephiran solution. The anterior lip of the cervix was grasped with a single toothed tenaculum. It was not necessary to dilate the cervix. The uterus was sounded with a pipelle to 7 centimeters and 2cc 1% lidocaine was injected into the cavity. The IUD was then inserted without difficulty. The strings were cut to approximately 3 centimeters and demonstrated to the patient. She experienced a mild  amount of cramping. Post Procedure Status:   She tolerated the procedure with mild discomfort. The patient was observed for 15 minutes after the insertion. There were no complications. Patient was discharged in stable condition. Patient was given routine post-IUD insertion instructions. She was advised to contact the MD for worsening pain, heavy bleeding, unexplained fever, if she cannot feel the IUD strings, if she thinks she may be pregnant, or if she is concerned she may have an STD. We recommend nothing per vagina x 7 days. We recommend follow up in six weeks with US or sooner if needed. The patient received  Leslie Bound IUD, lot number [id number]. The IUD did not come from a [employer].      Results for orders placed or performed in visit on 03/18/21   AMB POC URINE PREGNANCY TEST, VISUAL COLOR COMPARISON   Result Value Ref Range    VALID INTERNAL CONTROL POC Yes     HCG urine, Ql. (POC) Negative Negative

## 2021-04-29 ENCOUNTER — OFFICE VISIT (OUTPATIENT)
Dept: OBGYN CLINIC | Age: 26
End: 2021-04-29
Payer: COMMERCIAL

## 2021-04-29 VITALS
BODY MASS INDEX: 24.8 KG/M2 | DIASTOLIC BLOOD PRESSURE: 82 MMHG | WEIGHT: 140 LBS | SYSTOLIC BLOOD PRESSURE: 132 MMHG | HEART RATE: 80 BPM

## 2021-04-29 DIAGNOSIS — Z76.89 ENCOUNTER FOR MENSTRUAL REGULATION: Primary | ICD-10-CM

## 2021-04-29 DIAGNOSIS — Z97.5 IUD (INTRAUTERINE DEVICE) IN PLACE: ICD-10-CM

## 2021-04-29 DIAGNOSIS — R03.0 ELEVATED BLOOD PRESSURE READING: ICD-10-CM

## 2021-04-29 PROCEDURE — 99213 OFFICE O/P EST LOW 20 MIN: CPT | Performed by: OBSTETRICS & GYNECOLOGY

## 2021-04-29 NOTE — PROGRESS NOTES
[facility]  [email or web address]    Adin Griffith MD, [address] [facility]     OB/GYN Follow-up visit, IUD check with ultrasound    Chief Complaint: Follow up visit  Chief Complaint   Patient presents with    Checkup IUD       History of Present Illness: This is a follow up visit from an IUD insertion. The IUD was inserted on March 18, 2021. She does not not complain about abnormal bleeding. She does not not complain about pain/cramping. She does not have other concerns. PFSH:  Past Medical History:   Diagnosis Date    Pap smear for cervical cancer screening 08/16/2016; 2/23/21    Negative; neg     History reviewed. No pertinent surgical history. Family History   Problem Relation Age of Onset    Diabetes Father     Hypertension Father     No Known Problems Mother      Social History     Tobacco Use    Smoking status: Never Smoker    Smokeless tobacco: Never Used   Substance Use Topics    Alcohol use: Yes     Alcohol/week: 1.0 standard drinks     Types: 1 Glasses of wine per week    Drug use: No     No Known Allergies  No current outpatient medications on file. No current facility-administered medications for this visit.         Review of Systems:  History obtained from the patient  Constitutional: negative for fevers, chills and weight loss  ENT ROS: negative for - hearing change, oral lesions or visual changes  Respiratory: negative for cough, wheezing or dyspnea on exertion  Cardiovascular: negative for chest pain, irregular heart beats, exertional chest pressure/discomfort  Gastrointestinal: negative for dysphagia, nausea and vomiting  Genito-Urinary ROS: no dysuria, trouble voiding, or hematuria  Inteument/breast: negative for rash, breast lump and nipple discharge  Musculoskeletal:negative for stiff joints, neck pain and muscle weakness  Endocrine ROS: negative for - breast changes, galactorrhea or temperature intolerance  Hematological and Lymphatic ROS: negative for - blood clots, bruising or swollen lymph nodes    Physical Exam:  Visit Vitals  /82 (BP 1 Location: Left upper arm, BP Patient Position: Sitting, BP Cuff Size: Adult)   Pulse 80   Wt 140 lb (63.5 kg)   BMI 24.80 kg/m²       GENERAL: alert, well appearing, and in no distress  ABDOMEN: soft, nontender, nondistended, no masses or organomegaly   EGBUS: no lesions, no inflammation, no masses  VULVA: normal appearing vulva with no masses, tenderness or lesions  VAGINA: normal appearing vagina with normal color, no lesions, no discharge  CERVIX: normal appearing cervix without discharge or lesions, non tender  · IUD strings seen and appropriate length  UTERUS: uterus is normal size, shape, consistency and nontender   ADNEXA: normal adnexa in size, nontender and no masses  NEURO: alert, oriented, normal speech    Assessment:  Encounter Diagnoses   Name Primary?  Encounter for menstrual regulation Yes    IUD (intrauterine device) in place     Elevated blood pressure reading        Plan:  The patient is advised that she should contact the office with any questions or concerns. She should make her routine annual gynecologic appointment if needed. Disc typical sx/bleeding patterns with IUD. Disc how to check IUD strings and screening with AE. Notify MD if any concerns. BP log  Disc typical sx with IUD and how to check strings if desired        No orders of the defined types were placed in this encounter. No results found for this visit on 04/29/21. Donnie Solitario MD    Physician review of ultrasound performed by technician    Today's ultrasound report and images were reviewed and discussed with the patient.   Please see images and imaging report entered by technician in PACS for more detail and progress note and diagnosis entered by MD.    Lizett Shah MD

## 2021-04-29 NOTE — PATIENT INSTRUCTIONS
Intrauterine Device (IUD) for Birth Control: Care Instructions  Your Care Instructions     The intrauterine device (IUD) is used to prevent pregnancy. It's a small, plastic, T-shaped device. Your doctor places the IUD in your uterus. If you and your doctor discuss it before you give birth, this can be done right after you have your baby. You are using either a hormonal IUD or a copper IUD. · Hormonal IUDs prevent pregnancy for 3 to 6 years, depending on which IUD is used. But your doctor may talk to you about leaving it in for longer. Once you have it, you don't have to do anything else to prevent pregnancy. · The copper IUD prevents pregnancy for 10 years. But your doctor may talk to you about leaving it in for longer. Once you have it, you don't have to do anything to prevent pregnancy. The IUD usually stays in the uterus until your doctor removes it. Follow-up care is a key part of your treatment and safety. Be sure to make and go to all appointments, and call your doctor if you are having problems. It's also a good idea to know your test results and keep a list of the medicines you take. How can you care for yourself at home? How do you use the IUD? · Your doctor inserts the IUD. This takes only a few minutes and can be done at your doctor's office. If you and your doctor discuss it before you give birth, the IUD can also be inserted right after you have your baby. · Your doctor may have you feel for the IUD string right after insertion, to be sure you know what it feels like. · If you want to check for the string on your own:  ? Insert a finger into your vagina and feel for the cervix, which is at the top of the vagina and feels harder than the rest of your vagina (some women say it feels like the tip of your nose). ? You should be able to feel the thin, plastic string coming out of the opening of your cervix. If you cannot feel the string, it doesn't always mean that the IUD is out of place. Sometimes the string is just difficult to feel or has been pulled up into the cervical canal (which will not harm you). · Your doctor may want to see you 4 to 6 weeks after the IUD insertion, to make sure it is in place. What if you think the IUD is not in place? Always read the label for specific instructions. Here are some basic guidelines:  · Call your doctor and use backup birth control, such as a condom, or don't have intercourse until you know the IUD is working. · If you had intercourse, you can use emergency contraception to help prevent pregnancy. The most effective emergency contraception is prescribed by a doctor. This includes a prescription pill. If your IUD is no longer in place, a doctor may be able to insert a copper IUD as emergency contraception. You can also get emergency contraceptive pills without a prescription [facility]. What about side effects, safe sex, and breastfeeding? · The IUD can have side effects. ? The hormonal IUD usually reduces menstrual flow and cramping over time. It can also cause spotting, mood swings, and breast tenderness. ? The copper IUD can cause longer and heavier periods. · After an IUD is first put in, you may have some mild cramping and light spotting for 1 to 2 days. · The IUD doesn't protect against sexually transmitted infections (STIs), such as herpes and HIV/AIDS. If you're not sure whether your sex partner might have an STI, use a condom to protect against disease. · It's safe to use while breastfeeding. When should you call for help? Call your doctor now or seek immediate medical care if:    · You have pain in your belly or pelvis.     · You have severe vaginal bleeding. This means that you are soaking through your usual pads or tampons each hour for 2 or more hours.     · You have vaginal discharge that smells bad.     · You have a fever.    Watch closely for changes in your health, and be sure to contact your doctor if you have any problems. Where can you learn more? Go to http://www.gray.com/  Enter H796 in the search box to learn more about \"Intrauterine Device (IUD) for Birth Control: Care Instructions. \"  Current as of: October 8, 2020               Content Version: 12.8  © 0615-5637 Healthwise, [employer]. Care instructions adapted under license by [employer] (which disclaims liability or warranty for this information). If you have questions about a medical condition or this instruction, always ask your healthcare professional. [address] any warranty or liability for your use of this information.

## 2021-07-16 ENCOUNTER — VIRTUAL VISIT (OUTPATIENT)
Dept: FAMILY MEDICINE CLINIC | Age: 26
End: 2021-07-16
Payer: COMMERCIAL

## 2021-07-16 DIAGNOSIS — G47.8 AWAKENS FROM SLEEP AT NIGHT: ICD-10-CM

## 2021-07-16 DIAGNOSIS — R06.02 SHORTNESS OF BREATH: ICD-10-CM

## 2021-07-16 DIAGNOSIS — G47.9 SLEEP DISTURBANCES: Primary | ICD-10-CM

## 2021-07-16 PROCEDURE — 99203 OFFICE O/P NEW LOW 30 MIN: CPT | Performed by: FAMILY MEDICINE

## 2021-07-16 NOTE — PROGRESS NOTES
Cyndie Osuna  32 y.o. female  1995  [address]  [id number]   Ronnell Lepe MD       Encounter Date and Time: July 16, 2021 at 9:00 AM    Consent: Cyndie Osuna, who was seen by synchronous (real-time) audio-video technology, and/or her healthcare decision maker, is aware that this patient-initiated, Telehealth encounter on 7/16/2021 is a billable service, with coverage as determined by her insurance carrier. She is aware that she may receive a bill and has provided verbal consent to proceed: Yes. Chief Complaint   Patient presents with    Shortness of Breath     History of Present Illness   Cyndie Osuna is a 32 y.o. female was evaluated by synchronous (real-time) audio-video technology from home, through a secure patient portal.    Wakes up in the middle of the night and can't breath. Does away in an hour. States this has nothing to do with her dreams. Patient is sweating and feels like she is suffocating. Started 1/2020 and happens 1-2 times a month. No wheezing or coughing during episode. No issues with running in cold outside. No hx of asthma. States its harder to get air in rather than out. States she is normally not anxious and has never had a panic attack. Notes first time this happened she had chest pain all day, [facility] did [facility] E[facility] and CXR and was told she was fine. Normally exercises 3 times a week, combination of kickball and aerobic.       Review of Systems   ROS    Vitals/Objective:     General: alert, cooperative, no distress   Mental  status: mental status: alert, oriented to person, place, and time, normal mood, behavior, speech, dress, motor activity, and thought processes   Resp: resp: normal effort and no respiratory distress   Neuro: neuro: no gross deficits   Skin: skin: no discoloration or lesions of concern on visible areas   Due to this being a TeleHealth evaluation, many elements of the physical examination are unable to be assessed. Assessment and Plan:       1. Sleep disturbances  - SLEEP MEDICINE REFERRAL    2. Awakens from sleep at night  - SLEEP MEDICINE REFERRAL    3. Shortness of breath  - SLEEP MEDICINE REFERRAL      If sleep workup negative, consider vocal cord dysfunction and ENT evaluation vs PFT. Does not appear cardiac, psych or pulm in origin based on her history,      Time spent in direct conversation with the patient to include medical condition(s) discussed, assessment and treatment plan:       We discussed the expected course, resolution and complications of the diagnosis(es) in detail. Medication risks, benefits, costs, interactions, and alternatives were discussed as indicated. I advised her to contact the office if her condition worsens, changes or fails to improve as anticipated. She expressed understanding with the diagnosis(es) and plan. Patient understands that this encounter was a temporary measure, and the importance of further follow up and examination was emphasized. Patient verbalized understanding. Patient informed to follow up: Follow-up and Dispositions  ·   Return in about 2 months (around 9/16/2021) for Follow up after sleep evaluation . Electronically Signed: Elvin Staples MD    CPT Codes 12850-68299 for Established Patients may apply to this Telehealth Visit. POS code: 18. Modifier GT    Yolanda Katz is a 32 y.o. female who was evaluated by an audio-video encounter for concerns as above. Patient identification was verified prior to start of the visit. A caregiver was present when appropriate. Due to this being a TeleHealth encounter ([facility] public health emergency), evaluation of the following organ systems was limited: Vitals/Constitutional/EENT/Resp/CV/GI//MS/Neuro/Skin/Heme-Lymph-Imm.   Pursuant to the emergency declaration under the [address] waiver authority and the Coronavirus Preparedness and Response Supplemental Appropriations Act, this Virtual Visit was conducted, with patient's (and/or legal guardian's) consent, to reduce the patient's risk of exposure to COVID-19 and provide necessary medical care. Services were provided through a synchronous discussion virtually to substitute for in-person clinic visit. I was at home. The patient was at home. History   Patients past medical, surgical and family histories were reviewed and updated. Past Medical History:   Diagnosis Date    Pap smear for cervical cancer screening 08/16/2016; 2/23/21    Negative; neg     No past surgical history on file. Family History   Problem Relation Age of Onset    Diabetes Father     Hypertension Father     No Known Problems Mother      Social History     Tobacco Use    Smoking status: Never Smoker    Smokeless tobacco: Never Used   Vaping Use    Vaping Use: Never used   Substance Use Topics    Alcohol use:  Yes     Alcohol/week: 1.0 standard drinks     Types: 1 Glasses of wine per week    Drug use: No     Patient Active Problem List   Diagnosis Code    Dysmenorrhea N94.6          Current Medications/Allergies   Medications and Allergies reviewed:      No Known Allergies

## 2021-08-10 ENCOUNTER — OFFICE VISIT (OUTPATIENT)
Dept: SLEEP MEDICINE | Age: 26
End: 2021-08-10
Payer: COMMERCIAL

## 2021-08-10 VITALS
TEMPERATURE: 98.3 F | WEIGHT: 140 LBS | BODY MASS INDEX: 24.8 KG/M2 | DIASTOLIC BLOOD PRESSURE: 88 MMHG | HEART RATE: 80 BPM | SYSTOLIC BLOOD PRESSURE: 124 MMHG | OXYGEN SATURATION: 99 %

## 2021-08-10 DIAGNOSIS — G47.33 OSA (OBSTRUCTIVE SLEEP APNEA): Primary | ICD-10-CM

## 2021-08-10 PROCEDURE — 99204 OFFICE O/P NEW MOD 45 MIN: CPT | Performed by: SPECIALIST

## 2021-08-10 NOTE — PATIENT INSTRUCTIONS
Learning About Sleep Apnea  What is it? Sleep apnea means that breathing stops for short periods during sleep. When you stop breathing or have reduced airflow into your lungs during sleep, you don't sleep well and you can be very tired during the day. The oxygen levels in your blood may go down, and carbon dioxide levels go up. It may lead to other problems, such as high blood pressure and heart disease. Sleep apnea can range from mild to severe, based on how often breathing stops during sleep. Breathing may stop as few as 5 times an hour (mild apnea) to 30 or more times an hour (severe apnea). Obstructive sleep apnea is the most common type. This most often occurs because your airways are blocked or partly blocked. Central sleep apnea is less common. It happens when the brain has trouble controlling breathing. Some people have both types. That's called complex sleep apnea. What are the symptoms? There are symptoms of sleep apnea that you may notice and symptoms that others may notice when you're asleep. Symptoms you may notice include:  · Feeling extremely sleepy during the day. · Feeling unrefreshed or tired after a night's sleep. · Problems with memory and concentration, or mood changes. · Morning headaches. · Getting up often during the night to urinate. · A dry mouth or sore throat in the morning. Your bed partner may notice that you:  · Have episodes of not breathing. · Snore loudly. Almost all people who have sleep apnea snore. But not all people who snore have sleep apnea. · Toss and turn during sleep. · Have nighttime choking or gasping spells. How is it diagnosed? Your doctor will probably do a physical exam and ask about your past health. The doctor may also ask you or your bed partner about your snoring and sleep behavior and how tired you feel during the day. Your doctor may suggest a sleep study.  Sleep studies are a series of tests that look at what happens to the body during sleep. They check for how often you stop breathing or have too little air flowing into your lungs during sleep. They also find out how much oxygen you have in your blood during sleep. A sleep study may take place in your home. Or it might take place at a sleep center, where you will spend the night. If your sleep apnea doesn't improve with treatment, you may have more tests to find out what's causing it. How is it treated? You may be able to help treat sleep apnea by making some lifestyle changes. You could try to lose weight, sleep on your side, and avoid alcohol and medicines like sedatives before bed. Sleep apnea is often treated with machines that deliver air through a mask to help keep your airways open. These include:  · Continuous positive airway pressure (CPAP). This increases air pressure in your throat. It keeps your airway open when you breathe in. It's the most common device. · Bilevel positive airway pressure (BiPAP). This uses different air pressures when you breathe in and out. · Adaptive servo ventilation (ASV). It senses pauses in breathing and adjusts air pressure. It's mostly used for central sleep apnea. You can also try oral breathing devices or nasal devices. If your tonsils or other tissues are blocking your airway, your doctor may suggest surgery to open the airway. How can you care for yourself at home? · Lose weight, if needed. · Sleep on your side. It may help mild apnea. · Avoid alcohol and medicines such as sleeping pills, opioids, or sedatives before bed. · Don't smoke. If you need help quitting, talk to your doctor. · Prop up the head of your bed. · Treat breathing problems, such as a stuffy nose, that are caused by a cold or allergies. · Try a continuous positive airway pressure (CPAP) breathing machine if your doctor recommends it. · If CPAP doesn't work for you, ask your doctor if you can try other masks, settings, or breathing machines.   · Try oral breathing devices or other nasal devices. · Talk to your doctor if your nose feels dry or bleeds, or if it gets runny or stuffy when you use a breathing machine. · Tell your doctor if you're sleepy during the day and it affects your daily life. Don't drive or operate machinery when you're drowsy. Where can you learn more? Go to http://www.gray.com/  Enter S121 in the search box to learn more about \"Learning About Sleep Apnea. \"  Current as of: October 26, 2020               Content Version: 12.8  © 1688-1490 [employer]. Care instructions adapted under license by [employer] (which disclaims liability or warranty for this information). If you have questions about a medical condition or this instruction, always ask your healthcare professional. [address] any warranty or liability for your use of this information.

## 2021-08-10 NOTE — PROGRESS NOTES
[address] [facility] [place]. [place], [address]  Tel.  [phone]  Fax. [address] [facility] [address]  [place], [address] [facility]  Tel.  [phone]  Fax. [phone] [address]  Tel.  [phone]  Fax. [phone]       Chief Complaint       Chief Complaint   Patient presents with    Sleep Problem     NP; ref Dr Eller Keephannah; waking up in the middle of night, not being able to breathe       HPI      Arabella Rodriguez is 32 y.o. female seen for evaluation of a sleep disorder. Patient notes episodes of nocturnal awakening. States more prominent since January 2020. Occurring once or twice a month. She states that she will awaken after several hours of sleep \"like I cannot breathe\". She has been told of mild snoring. She states this sensation lasts for up to 20 minutes and then resolves. She notes some associated chest discomfort. She notes vivid dreams/nightmares. She denies sleep talking and sleepwalking, bruxism, nocturnal incontinence, abnormal arm or leg movements, hypnagogic hallucinations, sleep paralysis or cataplexy. The patient retires at 12 am and awakens at 7:30 am. The patient notes that she will not experience frequent awakening from sleep. In general she is able to return to sleep after awakening. The patient has not undergone diagnostic testing for the current problems. [place] Sleepiness Score: 2       No Known Allergies         She  has a past medical history of Pap smear for cervical cancer screening (08/16/2016; 2/23/21). She  has no past surgical history on file. She family history includes Diabetes in her father; Hypertension in her father; No Known Problems in her mother. She  reports that she has never smoked. She has never used smokeless tobacco. She reports current alcohol use of about 1.0 standard drinks of alcohol per week. She reports that she does not use drugs.      Review of Systems:  Review of Systems   Constitutional: Negative for chills and fever. HENT: Negative for hearing loss and tinnitus. Eyes: Negative for blurred vision and double vision. Respiratory: Negative for cough and shortness of breath. Cardiovascular: Negative for chest pain and palpitations. Gastrointestinal: Negative for abdominal pain and heartburn. Genitourinary: Negative for frequency and urgency. Musculoskeletal: Negative for back pain and neck pain. Skin: Negative for itching and rash. Neurological: Negative for dizziness and headaches. Psychiatric/Behavioral: Negative for depression. The patient is not nervous/anxious. Objective:     Visit Vitals  /88   Pulse 80   Temp 98.3 °F (36.8 °C)   Wt 140 lb (63.5 kg)   SpO2 99%   BMI 24.80 kg/m²     Body mass index is 24.8 kg/m². General:   Conversant, cooperative   Eyes:  Pupils equal and reactive, no nystagmus   Oropharynx:   Mallampati score I , tongue normal       Neck:   No carotid bruits; Chest/Lungs:  Clear on auscultation    CVS:  Normal rate, regular rhythm   Skin:  Warm to touch; no obvious rashes   Neuro:  Speech fluent, face symmetrical, tongue movement normal   Psych:  Normal affect,  normal countenance        Assessment:       ICD-10-CM ICD-9-CM    1. GILBERTO (obstructive sleep apnea)  G47.33 327.23 SLEEP STUDY UNATTENDED, 4 CHANNEL     Patient notes episodes of difficulty breathing, mild snoring. She will be evaluated with a home sleep test.  Results to be reviewed with her. Plan:     Orders Placed This Encounter    SLEEP STUDY UNATTENDED, 4 CHANNEL     Order Specific Question:   Reason for Exam     Answer:   nocturnal awakening       * Patient has a history potentially consistent with the diagnosis of sleep apnea. *Home sleep testing was ordered for initial evaluation. * She was provided information on sleep apnea including corresponding risk factors and the importance of proper treatment.    * Treatment options if indicated were reviewed today. Instructions:    o Do not engage in activities requiring a normal degree of alertness if fatigue is present. o The patient understands that untreated or undertreated sleep apnea could impair judgement and the ability to function normally during the day.  o Call or return if symptoms worsen or persist.          Michael Pichardo MD, [facility]  Electronically signed 08/10/21       This note was created using voice recognition software. Despite editing, there may be syntax errors. This note will not be viewable in [address]

## 2021-08-20 ENCOUNTER — TELEPHONE (OUTPATIENT)
Dept: SLEEP MEDICINE | Age: 26
End: 2021-08-20

## 2022-04-17 ENCOUNTER — PATIENT MESSAGE (OUTPATIENT)
Dept: OBGYN CLINIC | Age: 27
End: 2022-04-17

## 2022-04-18 ENCOUNTER — OFFICE VISIT (OUTPATIENT)
Dept: OBGYN CLINIC | Age: 27
End: 2022-04-18
Payer: COMMERCIAL

## 2022-04-18 VITALS
WEIGHT: 148 LBS | BODY MASS INDEX: 26.22 KG/M2 | SYSTOLIC BLOOD PRESSURE: 136 MMHG | HEART RATE: 90 BPM | HEIGHT: 63 IN | DIASTOLIC BLOOD PRESSURE: 86 MMHG

## 2022-04-18 DIAGNOSIS — Z11.3 SCREENING EXAMINATION FOR VENEREAL DISEASE: ICD-10-CM

## 2022-04-18 DIAGNOSIS — Z23 ENCOUNTER FOR IMMUNIZATION: ICD-10-CM

## 2022-04-18 DIAGNOSIS — Z71.85 HPV VACCINE COUNSELING: ICD-10-CM

## 2022-04-18 DIAGNOSIS — Z01.419 ENCOUNTER FOR GYNECOLOGICAL EXAMINATION (GENERAL) (ROUTINE) WITHOUT ABNORMAL FINDINGS: Primary | ICD-10-CM

## 2022-04-18 DIAGNOSIS — Z01.812 PRE-PROCEDURE LAB EXAM: ICD-10-CM

## 2022-04-18 DIAGNOSIS — Z71.1 PHYSICALLY WELL BUT WORRIED: ICD-10-CM

## 2022-04-18 DIAGNOSIS — N94.6 DYSMENORRHEA: ICD-10-CM

## 2022-04-18 DIAGNOSIS — Z72.51 UNPROTECTED SEXUAL INTERCOURSE: ICD-10-CM

## 2022-04-18 DIAGNOSIS — Z23 NEED FOR HPV VACCINATION: ICD-10-CM

## 2022-04-18 LAB
HCG URINE, QL. (POC): NEGATIVE
VALID INTERNAL CONTROL?: YES

## 2022-04-18 PROCEDURE — 90651 9VHPV VACCINE 2/3 DOSE IM: CPT | Performed by: OBSTETRICS & GYNECOLOGY

## 2022-04-18 PROCEDURE — 90471 IMMUNIZATION ADMIN: CPT | Performed by: OBSTETRICS & GYNECOLOGY

## 2022-04-18 PROCEDURE — 81025 URINE PREGNANCY TEST: CPT | Performed by: OBSTETRICS & GYNECOLOGY

## 2022-04-18 PROCEDURE — 99395 PREV VISIT EST AGE 18-39: CPT | Performed by: OBSTETRICS & GYNECOLOGY

## 2022-04-18 NOTE — PATIENT INSTRUCTIONS
Well Visit, Ages 25 to 48: Care Instructions  Overview     Well visits can help you stay healthy. Your doctor has checked your overall health and may have suggested ways to take good care of yourself. Your doctor also may have recommended tests. At home, you can help prevent illness with healthy eating, regular exercise, and other steps. Follow-up care is a key part of your treatment and safety. Be sure to make and go to all appointments, and call your doctor if you are having problems. It's also a good idea to know your test results and keep a list of the medicines you take. How can you care for yourself at home? · Get screening tests that you and your doctor decide on. Screening helps find diseases before any symptoms appear. · Eat healthy foods. Choose fruits, vegetables, whole grains, protein, and low-fat dairy foods. Limit fat, especially saturated fat. Reduce salt in your diet. · Limit alcohol. If you are a man, have no more than 2 drinks a day or 14 drinks a week. If you are a woman, have no more than 1 drink a day or 7 drinks a week. · Get at least 30 minutes of physical activity on most days of the week. Walking is a good choice. You also may want to do other activities, such as running, swimming, cycling, or playing tennis or team sports. Discuss any changes in your exercise program with your doctor. · Reach and stay at a healthy weight. This will lower your risk for many problems, such as obesity, diabetes, heart disease, and high blood pressure. · Do not smoke or allow others to smoke around you. If you need help quitting, talk to your doctor about stop-smoking programs and medicines. These can increase your chances of quitting for good. · Care for your mental health. It is easy to get weighed down by worry and stress. Learn strategies to manage stress, like deep breathing and mindfulness, and stay connected with your family and community.  If you find you often feel sad or hopeless, talk with your doctor. Treatment can help. · Talk to your doctor about whether you have any risk factors for sexually transmitted infections (STIs). You can help prevent STIs if you wait to have sex with a new partner (or partners) until you've each been tested for STIs. It also helps if you use condoms (male or female condoms) and if you limit your sex partners to one person who only has sex with you. Vaccines are available for some STIs, such as HPV. · Use birth control if it's important to you to prevent pregnancy. Talk with your doctor about the choices available and what might be best for you. · If you think you may have a problem with alcohol or drug use, talk to your doctor. This includes prescription medicines (such as amphetamines and opioids) and illegal drugs (such as cocaine and methamphetamine). Your doctor can help you figure out what type of treatment is best for you. · Protect your skin from too much sun. When you're outdoors from 10 a.m. to 4 p.m., stay in the shade or cover up with clothing and a hat with a wide brim. Wear sunglasses that block UV rays. Even when it's cloudy, put broad-spectrum sunscreen (SPF 30 or higher) on any exposed skin. · See a dentist one or two times a year for checkups and to have your teeth cleaned. · Wear a seat belt in the car. When should you call for help? Watch closely for changes in your health, and be sure to contact your doctor if you have any problems or symptoms that concern you. Where can you learn more? Go to http://www.[employer].com/  Enter P072 in the search box to learn more about \"Well Visit, Ages 25 to 48: Care Instructions. \"  Current as of: October 6, 2021               Content Version: 13.2  © 1650-4822 [employer], [employer]. Care instructions adapted under license by [employer] (which disclaims liability or warranty for this information).  If you have questions about a medical condition or this instruction, always ask your healthcare professional. [place] [address] any warranty or liability for your use of this information. Vaccine Information Statement    HPV (Human Papillomavirus) Vaccine: What You Need to Know    Many vaccine information statements are available in Macedonian and other languages. See www.immunize.org/vis. Hojas de información sobre vacunas están disponibles en español y en muchos otros idiomas. Visite www.immunize.org/vis. 1. Why get vaccinated? HPV (human papillomavirus) vaccine can prevent infection with some types of human papillomavirus. HPV infections can cause certain types of cancers, including:     cervical, vaginal, and vulvar cancers in women    penile cancer in men   anal cancers in both men and women   cancers of tonsils, base of tongue, and back of throat (oropharyngeal cancer) in both men and women    HPV infections can also cause anogenital warts. HPV vaccine can prevent over 90% of cancers caused by HPV. HPV is spread through intimate skin-to-skin or sexual contact. HPV infections are so common that nearly all people will get at least one type of HPV at some time in their lives. Most HPV infections go away on their own within 2 years. But sometimes HPV infections will last longer and can cause cancers later in life. 2. HPV vaccine    HPV vaccine is routinely recommended for adolescents at 6or 15years of age to ensure they are protected before they are exposed to the virus. HPV vaccine may be given beginning at age 5 years and vaccination is recommended for everyone through 32years of age. HPV vaccine may be given to adults 32 through 39years of age, based on discussions between the patient and health care provider. Most children who get the first dose before 13years of age need 2 doses of HPV vaccine.  People who get the first dose at or after 13years of age and younger people with certain immunocompromising conditions need 3 doses. Your health care provider can give you more information. HPV vaccine may be given at the same time as other vaccines. 3. Talk with your health care provider    Tell your vaccination provider if the person getting the vaccine:   Has had an allergic reaction after a previous dose of HPV vaccine, or has any severe, life-threatening allergies    Is pregnant--HPV vaccine is not recommended until after pregnancy    In some cases, your health care provider may decide to postpone HPV vaccination until a future visit. People with minor illnesses, such as a cold, may be vaccinated. People who are moderately or severely ill should usually wait until they recover before getting HPV vaccine. Your health care provider can give you more information. 4. Risks of a vaccine reaction     Soreness, redness, or swelling where the shot is given can happen after HPV vaccination.  Fever or headache can happen after HPV vaccination. People sometimes faint after medical procedures, including vaccination. Tell your provider if you feel dizzy or have vision changes or ringing in the ears. As with any medicine, there is a very remote chance of a vaccine causing a severe allergic reaction, other serious injury, or death. 5. What if there is a serious problem? An allergic reaction could occur after the vaccinated person leaves the clinic. If you see signs of a severe allergic reaction (hives, swelling of the face and throat, difficulty breathing, a fast heartbeat, dizziness, or weakness), call 9-1-1 and get the person to the nearest hospital.    For other signs that concern you, call your health care provider. Adverse reactions should be reported to the Vaccine Adverse Event Reporting System (VAERS). Your health care provider will usually file this report, or you can do it yourself. Visit the VAERS website at www.vaers. hhs.gov or call 7-519.395.3482.  VAERS is only for reporting reactions, and VAERS staff members do not give medical advice. 6. The National Vaccine Injury Compensation Program    The [facility] Vaccine Injury Compensation Program (VICP) is a federal program that was created to compensate people who may have been injured by certain vaccines. Claims regarding alleged injury or death due to vaccination have a time limit for filing, which may be as short as two years. Visit the VICP website at [facility].gov/vaccinecompensation or call 6-957.724.8754 to learn about the program and about filing a claim. 7. How can I learn more?  Ask your health care provider.  Call your local or state health department.  Visit the website of the Food and Drug Administration (FDA) for vaccine package inserts and additional information at www.fda.gov/vaccines-blood-biologics/vaccines.  Contact the Centers for Disease Control and Prevention (CDC):  - Call 4-452.954.4514 (1-800-CDC-INFO) or  - Visit CDCs website at www.cdc.gov/vaccines. Vaccine Information Statement   HPV Vaccine   8/6/2021  42 GEMA Diaz Renny 601NE-81   Department of Health and Human Services  Centers for Disease Control and Prevention    Office Use Only    Vaccine Information Statement    HPV (Human Papillomavirus) Vaccine: What You Need to Know    Many vaccine information statements are available in [place]ian and other languages. See www.immunize.org/vis. Hojas de información sobre vacunas están disponibles en español y en muchos otros idiomas. Visite www.immunize.org/vis. 1. Why get vaccinated? HPV (human papillomavirus) vaccine can prevent infection with some types of human papillomavirus. HPV infections can cause certain types of cancers, including:     cervical, vaginal, and vulvar cancers in women    penile cancer in men   anal cancers in both men and women   cancers of tonsils, base of tongue, and back of throat (oropharyngeal cancer) in both men and women    HPV infections can also cause anogenital warts.     HPV vaccine can prevent over 90% of cancers caused by HPV. HPV is spread through intimate skin-to-skin or sexual contact. HPV infections are so common that nearly all people will get at least one type of HPV at some time in their lives. Most HPV infections go away on their own within 2 years. But sometimes HPV infections will last longer and can cause cancers later in life. 2. HPV vaccine    HPV vaccine is routinely recommended for adolescents at 6or 15years of age to ensure they are protected before they are exposed to the virus. HPV vaccine may be given beginning at age 5 years and vaccination is recommended for everyone through 32years of age. HPV vaccine may be given to adults 32 through 39years of age, based on discussions between the patient and health care provider. Most children who get the first dose before 13years of age need 2 doses of HPV vaccine. People who get the first dose at or after 13years of age and younger people with certain immunocompromising conditions need 3 doses. Your health care provider can give you more information. HPV vaccine may be given at the same time as other vaccines. 3. Talk with your health care provider    Tell your vaccination provider if the person getting the vaccine:   Has had an allergic reaction after a previous dose of HPV vaccine, or has any severe, life-threatening allergies    Is pregnant--HPV vaccine is not recommended until after pregnancy    In some cases, your health care provider may decide to postpone HPV vaccination until a future visit. People with minor illnesses, such as a cold, may be vaccinated. People who are moderately or severely ill should usually wait until they recover before getting HPV vaccine. Your health care provider can give you more information. 4. Risks of a vaccine reaction     Soreness, redness, or swelling where the shot is given can happen after HPV vaccination.    Fever or headache can happen after HPV vaccination. People sometimes faint after medical procedures, including vaccination. Tell your provider if you feel dizzy or have vision changes or ringing in the ears. As with any medicine, there is a very remote chance of a vaccine causing a severe allergic reaction, other serious injury, or death. 5. What if there is a serious problem? An allergic reaction could occur after the vaccinated person leaves the clinic. If you see signs of a severe allergic reaction (hives, swelling of the face and throat, difficulty breathing, a fast heartbeat, dizziness, or weakness), call 9-1-1 and get the person to the nearest hospital.    For other signs that concern you, call your health care provider. Adverse reactions should be reported to the Vaccine Adverse Event Reporting System (VAERS). Your health care provider will usually file this report, or you can do it yourself. Visit the VAERS website at www.vaers. [facility].gov or call 4-812.306.6341. VAERS is only for reporting reactions, and VAERS staff members do not give medical advice. 6. The National Vaccine Injury Compensation Program    The [facility] Vaccine Injury Compensation Program (VICP) is a federal program that was created to compensate people who may have been injured by certain vaccines. Claims regarding alleged injury or death due to vaccination have a time limit for filing, which may be as short as two years. Visit the VICP website at www.hrsa.gov/vaccinecompensation or call 6-458.159.3691 to learn about the program and about filing a claim. 7. How can I learn more?  Ask your health care provider.  Call your local or state health department.  Visit the website of the Food and Drug Administration (FDA) for vaccine package inserts and additional information at www.fda.gov/vaccines-blood-biologics/vaccines.    Contact the Centers for Disease Control and Prevention (CDC):  - Call 0-943.313.4440 (1-800-CDC-INFO) or  - Visit CDCs website at www.cdc.gov/vaccines. Vaccine Information Statement   HPV Vaccine   8/6/2021  42 GEMA [place]ral [id number]   Department of Health and Human Services  Centers for Disease Control and Prevention    Office Use Only

## 2022-04-18 NOTE — PROGRESS NOTES
Deisy Jaffe is a 32 y.o. female who presents for HPV (GARDASIL 9) immunization per verbal order from Pieter Winn MD.  She denies any symptoms , reactions or allergies that would exclude them from being immunized today. Risks and adverse reactions were discussed and the VIS was given to her. All questions were addressed. She was observed for 10 min post injection. There were no reactions observed.

## 2022-04-18 NOTE — PROGRESS NOTES
[facility]  [email or web address]  [phone]    Soheila Guerin MD, FACOG       Annual Gynecologic Exam:  [facility] <40  Chief Complaint   Patient presents with    Well Woman         Erna Domínguez is a 32 y.o.  WHITE/NON- female who presents for an annual well woman exam.  No LMP recorded. .    She reports the following additional concerns: she would like STD screening/bloodwork d/t unprotected intercourse. She would like to know if she is eligable to get the HPV (gardasil) vaccine.   kyleena inserted 3/18/2021, due out 3/18/2026. No cycles or cramping with IUD. Menstrual status:  She does not report dysmenorrhea/painful menses. She does not report heavy menses. She does not report irregular bleeding. Sexual history and Contraception:  Social History     Substance and Sexual Activity   Sexual Activity Yes    Partners: Male    Birth control/protection: Condom       She does reports new sexual partner(s) in the last year. Preventive Medicine History:  Her most recent Pap smear result: normal was obtained in 2021  Her most recent HR HPV screen was not obtained in     She does not have a history of FAY 2, 3 or cervical cancer. Past Medical History:   Diagnosis Date    Encounter for IUD insertion 2021    Indira Cortés due out 3/18/2026    Pap smear for cervical cancer screening 2016; 21    Negative; neg     OB History    Para Term  AB Living   0 0 0 0 0 0   SAB IAB Ectopic Molar Multiple Live Births   0 0 0   0       History reviewed. No pertinent surgical history.   Family History   Problem Relation Age of Onset    Diabetes Father     Hypertension Father     No Known Problems Mother      Social History     Socioeconomic History    Marital status: SINGLE     Spouse name: Not on file    Number of children: Not on file    Years of education: Not on file    Highest education level: Not on file   Occupational History  Not on file   Tobacco Use    Smoking status: Never Smoker    Smokeless tobacco: Never Used   Vaping Use    Vaping Use: Never used   Substance and Sexual Activity    Alcohol use: Yes     Alcohol/week: 2.0 - 3.0 standard drinks     Types: 2 - 3 Glasses of wine per week     Comment: weekly    Drug use: No    Sexual activity: Yes     Partners: Male     Birth control/protection: Condom   Other Topics Concern     Service Not Asked    Blood Transfusions Not Asked    Caffeine Concern Not Asked    Occupational Exposure Not Asked    Hobby Hazards Not Asked    Sleep Concern Not Asked    Stress Concern Not Asked    Weight Concern Not Asked    Special Diet Not Asked    Back Care Not Asked    Exercise Not Asked    Bike Helmet Not Asked   [address] Not Asked    Self-Exams Not Asked   Social History Narrative    Not on file     Social Determinants of Health     Financial Resource Strain:     Difficulty of Paying Living Expenses: Not on file   Food Insecurity:     Worried About Running Out of Food in the Last Year: Not on file    Cruz of Food in the Last Year: Not on file   Transportation Needs:     Lack of Transportation (Medical): Not on file    Lack of Transportation (Non-Medical):  Not on file   Physical Activity:     Days of Exercise per Week: Not on file    Minutes of Exercise per Session: Not on file   Stress:     Feeling of Stress : Not on file   Social Connections:     Frequency of Communication with Friends and Family: Not on file    Frequency of Social Gatherings with Friends and Family: Not on file    Attends Hindu Services: Not on file    Active Member of Clubs or Organizations: Not on file    Attends Club or Organization Meetings: Not on file    Marital Status: Not on file   Intimate Partner Violence:     Fear of Current or Ex-Partner: Not on file    Emotionally Abused: Not on file    Physically Abused: Not on file    Sexually Abused: Not on file   Housing Stability:     Unable to Pay for Housing in the Last Year: Not on file    Number of Places Lived in the Last Year: Not on file    Unstable Housing in the Last Year: Not on file       No Known Allergies    No current outpatient medications on file. No current facility-administered medications for this visit.        Patient Active Problem List   Diagnosis Code    Dysmenorrhea N94.6         Review of Systems - History obtained from the patient and patient filled out questionnaire   Constitutional/general, HEENT, CV, Resp, GI, MSK, Neuro, Psych, Heme/lymph, Skin, Breast ROS: no significant complaints except as noted on HPI    Physical Exam  Visit Vitals  /86   Pulse 90   Ht 5' 3\" (1.6 m)   Wt 148 lb (67.1 kg)   BMI 26.22 kg/m²       Constitutional  · Appearance: well-nourished, well developed, alert, in no acute distress    HENT  · Head and Face: appears normal    Neck  · Inspection/Palpation: normal appearance, no masses or tenderness  · Lymph Nodes: no lymphadenopathy present  · Thyroid: gland size normal, nontender, no nodules or masses present on palpation    Chest  · Respiratory Effort: breathing unlabored  · Auscultation: normal breath sounds    Cardiovascular  · Heart:  · Auscultation: regular rate and rhythm without murmur    Breasts  · Inspection of Breasts: breasts symmetrical, no skin changes, no discharge present, nipple appearance normal, no skin retraction present  · Palpation of Breasts and Axillae: no masses present on palpation, no breast tenderness  · Axillary Lymph Nodes: no lymphadenopathy present    Gastrointestinal  · Abdominal Examination: abdomen non-tender to palpation, normal bowel sounds, no masses present  · Liver and spleen: no hepatomegaly present, spleen not palpable  · Hernias: no hernias identified    Genitourinary  · External Genitalia: normal appearance for age, no discharge present, no tenderness present, no inflammatory lesions present, no masses present  · Vagina: normal vaginal vault without central or paravaginal defects, white discharge present, no inflammatory lesions present, no masses present  · Bladder: non-tender to palpation  · Urethra: appears normal  · Cervix: normal   IUD strings seen and appropriate length  ·   · Uterus: normal size, shape and consistency  · Adnexa: no adnexal tenderness present, no adnexal masses present  · Perineum: perineum within normal limits, no evidence of trauma, no rashes or skin lesions present  · Anus: anus within normal limits, no hemorrhoids present  · Inguinal Lymph Nodes: no lymphadenopathy present    Skin  · General Inspection: no rash, no lesions identified    Neurologic/Psychiatric  · Mental Status:  · Orientation: grossly oriented to person, place and time  · Mood and Affect: mood normal, affect appropriate    Assessment:  32 y.o.  for well woman exam  Encounter Diagnoses   Name Primary?  Unprotected sexual intercourse     Screening examination for venereal disease     Physically well but worried     Encounter for gynecological examination (general) (routine) without abnormal findings Yes    Dysmenorrhea        Plan:  The patient was counseled about diet, exercise, healthy lifestyle  We discussed current pap smear and HR HPV testing guidelines. I recommended follow up one year for routine annual gynecologic exam or sooner prn  Handouts were given to the patient  I recommended follow up with a primary care physician for chronic medical problems and evaluation of non-gynecologic concerns and to please contact our office with any GYN questions or concerns.   I recommended testing per CDC guidelines and at patient request.   Disc rba of HPV vaccine and should check with insurance about coverage after 31 yo    Folllow up:  [x] return for annual well woman exam in one year or sooner if she is having problems  [] follow up and ultrasound  [] 6 months  [] 3 months  [] 6 weeks   [] 1 month    Orders Placed This Encounter  CT/NG/T.VAGINALIS AMPLIFICATION    HEP B SURFACE AG    T PALLIDUM SCREEN W/REFLEX    HIV SCREEN, 4TH GEN. W/REFLEX CONFIRM    HEPATITIS C AB       No results found for any visits on 04/18/22.

## 2022-04-20 LAB
C TRACH RRNA SPEC QL NAA+PROBE: NEGATIVE
HBV SURFACE AG SERPL QL IA: NEGATIVE
HCV AB S/CO SERPL IA: <0.1 S/CO RATIO (ref 0–0.9)
HIV 1+2 AB+HIV1 P24 AG SERPL QL IA: NON REACTIVE
N GONORRHOEA RRNA SPEC QL NAA+PROBE: NEGATIVE
T VAGINALIS RRNA SPEC QL NAA+PROBE: NEGATIVE
TREPONEMA PALLIDUM IGG+IGM AB [PRESENCE] IN SERUM OR PLASMA BY IMMUNOASSAY: NON REACTIVE

## 2022-06-20 ENCOUNTER — CLINICAL SUPPORT (OUTPATIENT)
Dept: OBGYN CLINIC | Age: 27
End: 2022-06-20
Payer: COMMERCIAL

## 2022-06-20 VITALS
BODY MASS INDEX: 25.87 KG/M2 | DIASTOLIC BLOOD PRESSURE: 88 MMHG | SYSTOLIC BLOOD PRESSURE: 130 MMHG | WEIGHT: 146 LBS | HEIGHT: 63 IN

## 2022-06-20 DIAGNOSIS — Z23 ENCOUNTER FOR IMMUNIZATION: Primary | ICD-10-CM

## 2022-06-20 DIAGNOSIS — Z01.812 PRE-PROCEDURE LAB EXAM: ICD-10-CM

## 2022-06-20 DIAGNOSIS — Z23 NEED FOR HPV VACCINATION: ICD-10-CM

## 2022-06-20 LAB
HCG URINE, QL. (POC): NEGATIVE
VALID INTERNAL CONTROL?: YES

## 2022-06-20 PROCEDURE — 90651 9VHPV VACCINE 2/3 DOSE IM: CPT | Performed by: OBSTETRICS & GYNECOLOGY

## 2022-06-20 PROCEDURE — 90471 IMMUNIZATION ADMIN: CPT | Performed by: OBSTETRICS & GYNECOLOGY

## 2022-06-20 PROCEDURE — 81025 URINE PREGNANCY TEST: CPT | Performed by: OBSTETRICS & GYNECOLOGY

## 2022-06-20 NOTE — PATIENT INSTRUCTIONS
HPV (Human Papillomavirus) Vaccine Gardasil®: What You Need to Know  What is HPV? Genital human papillomavirus (HPV) is the most common sexually transmitted virus in the [place]. More than half of sexually active men and women are infected with HPV at some time in their lives. About 20 million Americans are currently infected, and about 6 million more get infected each year. HPV is usually spread through sexual contact. Most HPV infections don't cause any symptoms, and go away on their own. But HPV can cause cervical cancer in women. Cervical cancer is the 2nd leading cause of cancer deaths among women around the world. In the [place], about 12,000 women get cervical cancer every year and about 4,000 are expected to die from it. HPV is also associated with several less common cancers, such as vaginal and vulvar cancers in women, and anal and oropharyngeal (back of the throat, including base of tongue and tonsils) cancers in both men and women. HPV can also cause genital warts and warts in the throat. There is no cure for HPV infection, but some of the problems it causes can be treated. HPV vaccineWhy get vaccinated? The HPV vaccine you are getting is one of two vaccines that can be given to prevent HPV. It may be given to both males and females. This vaccine can prevent most cases of cervical cancer in females, if it is given before exposure to the virus. In addition, it can prevent vaginal and vulvar cancer in females, and genital warts and anal cancer in both males and females. Protection from HPV vaccine is expected to be long-lasting. But vaccination is not a substitute for cervical cancer screening. Women should still get regular Pap tests. Who should get this HPV vaccine and when? HPV vaccine is given as a 3-dose series  · 1st Dose: Now  · 2nd Dose: 1 to 2 months after Dose 1  · 3rd Dose: 6 months after Dose 1  Additional (booster) doses are not recommended.   Routine vaccination  · This HPV vaccine is recommended for girls and boys 6or 15years of age. It may be given starting at age 5. Why is HPV vaccine recommended at 6or 15years of age? HPV infection is easily acquired, even with only one sex partner. That is why it is important to get HPV vaccine before any sexual contact takes place. Also, response to the vaccine is better at this age than at older ages. Catch-up vaccination  This vaccine is recommended for the following people who have not completed the 3-dose series:  · Females 15 through 32years of age  · Males 15 through 24years of age  This vaccine may be given to men 25 through 32years of age who have not completed the 3-dose series. It is recommended for men through age 32 who have sex with men or whose immune system is weakened because of HIV infection, other illness, or medications. HPV vaccine may be given at the same time as other vaccines. Some people should not get HPV vaccine or should wait  · Anyone who has ever had a life-threatening allergic reaction to any component of HPV vaccine, or to a previous dose of HPV vaccine, should not get the vaccine. Tell your doctor if the person getting vaccinated has any severe allergies, including an allergy to yeast.  · HPV vaccine is not recommended for pregnant women. However, receiving HPV vaccine when pregnant is not a reason to consider terminating the pregnancy. Women who are breast feeding may get the vaccine. · People who are mildly ill when a dose of HPV vaccine is planned can still be vaccinated. People with a moderate or severe illness should wait until they are better. What are the risks from this vaccine? This HPV vaccine has been used in the [place] and around the world for about six years and has been very safe. However, any medicine could possibly cause a serious problem, such as a severe allergic reaction.  The risk of any vaccine causing a serious injury, or death, is extremely small.  Life-threatening allergic reactions from vaccines are very rare. If they do occur, it would be within a few minutes to a few hours after the vaccination. Several mild to moderate problems are known to occur with this HPV vaccine. These do not last long and go away on their own. · Reactions in the arm where the shot was given:  ? Pain (about 8 people in 10)  ? Redness or swelling (about 1 person in 4)  · Fever  ? Mild (100°F) (about 1 person in 10)  ? Moderate (102°F) (about 1 person in 65)  · Other problems:  ? Headache (about 1 person in 3)  · Fainting: Brief fainting spells and related symptoms (such as jerking movements) can happen after any medical procedure, including vaccination. Sitting or lying down for about 15 minutes after a vaccination can help prevent fainting and injuries caused by falls. Tell your doctor if the patient feels dizzy or light-headed, or has vision changes or ringing in the ears. Like all vaccines, HPV vaccines will continue to be monitored for unusual or severe problems. What if there is a serious reaction? What should I look for? · Look for anything that concerns you, such as signs of a severe allergic reaction, very high fever, or behavior changes. Signs of a severe allergic reaction can include hives, swelling of the face and throat, difficulty breathing, a fast heartbeat, dizziness, and weakness. These would start a few minutes to a few hours after the vaccination. What should I do? · If you think it is a severe allergic reaction or other emergency that can't wait, call 9-1-1 or get the person to the nearest hospital. Otherwise, call your doctor. · Afterward, the reaction should be reported to the Vaccine Adverse Event Reporting System (VAERS). Your doctor might file this report, or you can do it yourself through the VAERS web site at www.vaers. hhs.gov, or by calling 8-174.432.9717. VAERS is only for reporting reactions. They do not give medical advice.   The National Vaccine Injury Compensation Program  The National Vaccine Injury Compensation Program (VICP) is a federal program that was created to compensate people who may have been injured by certain vaccines. Persons who believe they may have been injured by a vaccine can learn about the program and about filing a claim by calling 0-979.646.4612 or visiting the [employer] website at www.[facility].gov/vaccinecompensation. How can I learn more? · Ask your doctor. · Call your local or state health department. · Contact the Centers for Disease Control and Prevention (CDC):  ? Call 3-627.350.9716 (1-800-CDC-INFO) or  ? Visit the CDC's website at www.cdc.gov/vaccines. Vaccine Information Statement (Interim)  HPV Vaccine (Gardasil)  (5/17/2013)  42 GEMA Pal 309TO-92  Department of Health and Human Services  Centers for Disease Control and Prevention  Many Vaccine Information Statements are available in [place] and other languages. See www.immunize.org/vis. Muchas hojas de información sobre vacunas están disponibles en español y en otros idiomas. Visite www.immunize.org/vis. Care instructions adapted under license by [employer] (which disclaims liability or warranty for this information). If you have questions about a medical condition or this instruction, always ask your healthcare professional. [address] any warranty or liability for your use of this information.

## 2022-06-20 NOTE — PROGRESS NOTES
Rashida Morrow is a 32 y.o. female who presents for HPV immunization per verbal order from Nicolette Roper MD.  She denies any symptoms , reactions or allergies that would exclude them from being immunized today. Risks and adverse reactions were discussed and the VIS was given to her. All questions were addressed. She was observed for 10 min post injection. There were no reactions observed.

## 2024-04-11 ENCOUNTER — OFFICE VISIT (OUTPATIENT)
Age: 29
End: 2024-04-11
Payer: COMMERCIAL

## 2024-04-11 VITALS — BODY MASS INDEX: 25.51 KG/M2 | DIASTOLIC BLOOD PRESSURE: 84 MMHG | WEIGHT: 144 LBS | SYSTOLIC BLOOD PRESSURE: 128 MMHG

## 2024-04-11 DIAGNOSIS — Z97.5 IUD (INTRAUTERINE DEVICE) IN PLACE: ICD-10-CM

## 2024-04-11 DIAGNOSIS — Z01.419 ENCOUNTER FOR GYNECOLOGICAL EXAMINATION: Primary | ICD-10-CM

## 2024-04-11 PROCEDURE — 99395 PREV VISIT EST AGE 18-39: CPT | Performed by: OBSTETRICS & GYNECOLOGY

## 2024-04-11 NOTE — PROGRESS NOTES
Ladan Castillo is a 28 y.o. female returns for an annual exam     Chief Complaint   Patient presents with    Annual Exam       No LMP recorded. (Menstrual status: Chemically Induced).  Her periods are absent due to Kyleena  She does not have dysmenorrhea.  Problems: no problems  Birth Control: IUD; Kyleena due out 3/18/2026  Last Pap: see report obtained 3 year(s) ago.  She does not have a history of ANGEL 2, 3 or cervical cancer.   With regard to the Gardisil vaccine, she has received all 3 injections      1. Have you been to the ER, urgent care clinic, or hospitalized since your last visit? No    2. Have you seen or consulted any other health care providers outside of the [facility] since your last visit? No    Examination chaperoned by Bibiana Juarez LPN.  
Never   Substance and Sexual Activity    Alcohol use: Yes     Alcohol/week: 2.0 - 3.0 standard drinks of alcohol    Drug use: No    Sexual activity: Not on file   Other Topics Concern    Not on file   Social History Narrative    Not on file     Social Determinants of Health     Financial Resource Strain: Not on file   Food Insecurity: Not on file   Transportation Needs: Not on file   Physical Activity: Not on file   Stress: Not on file   Social Connections: Not on file   Intimate Partner Violence: Not on file   Housing Stability: Not on file     Tobacco History:  reports that she has never smoked. She has never used smokeless tobacco.  Alcohol Abuse:  reports current alcohol use of about 2.0 - 3.0 standard drinks of alcohol per week.  Drug Abuse:  reports no history of drug use.  Allergies: No Known Allergies  Patient Active Problem List   Diagnosis    Dysmenorrhea       Review of Systems - History obtained from the patient and patient filled out questionnaire   Constitutional/general, HEENT, CV, Resp, GI, MSK, Neuro, Psych, Heme/lymph, Skin, Breast ROS: no significant complaints except as noted on HPI    Physical Exam  /84   Wt 65.3 kg (144 lb)   BMI 25.51 kg/m²     Constitutional  Appearance: alert, in no acute distress    HENT  Head and Face: appears normal    Neck  Inspection/Palpation: normal appearance    Breasts  Inspection of Breasts: breasts symmetrical, no skin changes, no discharge present, nipple appearance normal, no skin retraction present  Palpation of Breasts and Axillae: no masses present on palpation, no breast tenderness  Axillary Lymph Nodes: no lymphadenopathy present    Gastrointestinal  Abdominal Examination: abdomen non-tender to palpation, no masses present    Genitourinary  External Genitalia: normal appearance for age  Vagina: normal vaginal vault, minimal discharge present  Bladder: non-tender to palpation  Urethra: appears normal  Cervix: normal, non tender   IUD strings seen and

## 2024-04-18 LAB
., LABCORP: NORMAL
CYTOLOGIST CVX/VAG CYTO: NORMAL
CYTOLOGY CVX/VAG DOC CYTO: NORMAL
CYTOLOGY CVX/VAG DOC THIN PREP: NORMAL
DX ICD CODE: NORMAL
Lab: NORMAL
Lab: NORMAL
OTHER STN SPEC: NORMAL
STAT OF ADQ CVX/VAG CYTO-IMP: NORMAL

## 2024-11-06 ENCOUNTER — TELEPHONE (OUTPATIENT)
Age: 29
End: 2024-11-06

## 2024-11-06 NOTE — TELEPHONE ENCOUNTER
Two patient identifiers used    29 year old patient last seen in the office on 4/11/2024    Patient has upcoming appointment for iud removal no replacement.    Patient is calling to say that she is becoming quite anxious about the procedure and is wondering is she might be given something.    Patient reports the insertion was a night mare and the ibuprofen  did not help at all.    Patient pharmacy confirmed     Patient does have some one to provide transportation if needed.    Please advice    Thank you

## 2024-11-07 NOTE — TELEPHONE ENCOUNTER
Cheli Mojica MD   to Me       11/6/24  5:09 PM  We can do a pre procedure consult if there is an opening and sign the consent.    TR      Patient advised of MD recommendations and was placed on the schedule to be seen for pre procedure consult on 9/13/2024 at 9am    Patient verbalized understanding.

## 2024-11-13 ENCOUNTER — OFFICE VISIT (OUTPATIENT)
Age: 29
End: 2024-11-13
Payer: COMMERCIAL

## 2024-11-13 VITALS
HEART RATE: 94 BPM | WEIGHT: 146 LBS | HEIGHT: 63 IN | SYSTOLIC BLOOD PRESSURE: 132 MMHG | DIASTOLIC BLOOD PRESSURE: 86 MMHG | BODY MASS INDEX: 25.87 KG/M2

## 2024-11-13 DIAGNOSIS — Z30.09 FAMILY PLANNING: Primary | ICD-10-CM

## 2024-11-13 DIAGNOSIS — Z01.818 PRE-PROCEDURAL EXAMINATION: ICD-10-CM

## 2024-11-13 PROCEDURE — 99212 OFFICE O/P EST SF 10 MIN: CPT | Performed by: OBSTETRICS & GYNECOLOGY

## 2024-11-13 RX ORDER — HYDROCODONE BITARTRATE AND ACETAMINOPHEN 5; 325 MG/1; MG/1
1 TABLET ORAL EVERY 6 HOURS PRN
Qty: 2 TABLET | Refills: 0 | Status: SHIPPED | OUTPATIENT
Start: 2024-11-13 | End: 2024-11-14

## 2024-11-13 RX ORDER — LORAZEPAM 0.5 MG/1
0.5 TABLET ORAL EVERY 8 HOURS PRN
Qty: 1 TABLET | Refills: 0 | Status: SHIPPED | OUTPATIENT
Start: 2024-11-13 | End: 2024-12-13

## 2024-11-13 NOTE — PROGRESS NOTES
Rooming note, gyn problem visit:    Chief Complaint   Patient presents with    Consult to Discuss IUD Removal      Ladan Castillo is a 29 y.o. female presents for a problem visit.    No LMP recorded. (Menstrual status: IUD).  Birth Control: IUD.    Last or next WWE is: 04/11/2024    The patient is reporting having: kyleena IUD inserted 03/18/2021 & was in pain during & after the insertion.   Pt wants to discuss pain management plan for IUD removal.   Pt scheduled for IUD removal tomorrow & is prepared to have a  ().   Is taking PNV & wants to conceive.     This is not a new problem.  She has not experienced this problem before.    She reports the symptoms are is unchanged.  Aggravating factors include none.  And alleviating factors include none.    She does not have other concerns.    
Anxiety for up to 30 days. Take one tablet one hour prior to procedure. Max Daily Amount: 1.5 mg, Disp: 1 tablet, Rfl: 0    History reviewed. No pertinent surgical history.    OB History    Para Term  AB Living   0   0 0 0 0   SAB IAB Ectopic Molar Multiple Live Births                     Family History   Problem Relation Age of Onset    Hypertension Father     Diabetes Father     No Known Problems Mother        Social History     Socioeconomic History    Marital status: Single     Spouse name: None    Number of children: None    Years of education: None    Highest education level: None   Tobacco Use    Smoking status: Never    Smokeless tobacco: Never   Substance and Sexual Activity    Alcohol use: Yes     Alcohol/week: 2.0 standard drinks of alcohol     Types: 2 Glasses of wine per week    Drug use: No    Sexual activity: Yes     Partners: Male     Birth control/protection: I.U.D.       No Known Allergies    Patient Active Problem List   Diagnosis    Dysmenorrhea       Review of Systems: History obtained from the patient  Constitutional: see HPI   Breast: no breast changes  GI: no bowel complaints  : see HPI   MSK: negative for back pain, joint pain, muscle pain  Skin: no skin change concerns  Psych: stable, no acute concerns    Objective:  /86   Pulse 94   Ht 1.6 m (5' 3\")   Wt 66.2 kg (146 lb)   BMI 25.86 kg/m²     PHYSICAL EXAMINATION:  Gen: no acute distress, normal speech  Skin: normal coloration, no suspicious skin changes  Cardiac: appears warm and well perfused  Pulm: breathing comfortably on room air  Ext: symmetric; moves all 4 extremities equally  Neuro: alert, normal speech        ASSESSMENT:  Encounter Diagnoses   Name Primary?    Family planning Yes    Pre-procedural examination          PLAN:  The patient was instructed to follow up as needed if symptoms persist or worsen.  Instructions were given to patient and the patient was given the opportunity to ask any questions

## 2024-11-14 ENCOUNTER — PROCEDURE VISIT (OUTPATIENT)
Age: 29
End: 2024-11-14
Payer: COMMERCIAL

## 2024-11-14 VITALS
HEART RATE: 90 BPM | WEIGHT: 148 LBS | OXYGEN SATURATION: 96 % | BODY MASS INDEX: 26.22 KG/M2 | SYSTOLIC BLOOD PRESSURE: 145 MMHG | DIASTOLIC BLOOD PRESSURE: 82 MMHG

## 2024-11-14 DIAGNOSIS — Z30.432 ENCOUNTER FOR IUD REMOVAL: Primary | ICD-10-CM

## 2024-11-14 PROCEDURE — 58301 REMOVE INTRAUTERINE DEVICE: CPT | Performed by: OBSTETRICS & GYNECOLOGY

## 2024-11-14 NOTE — PROGRESS NOTES
Ladan Castillo is a 29 y.o. female presents for a problem visit.    Chief Complaint   Patient presents with    IUD Removal             No LMP recorded. (Menstrual status: IUD).    Birth Control: IUD.    Last Pap: 4/11/2024 see report.        1. Have you been to the ER, urgent care clinic, or hospitalized since your last visit? No    2. Have you seen or consulted any other health care providers outside of the [facility] since your last visit? No        Chart reviewed for the following:   Bibiana BURNS LPN, have reviewed the History, Physical and updated the Allergic reactions for Ladan Castillo     TIME OUT performed immediately prior to start of procedure:   Bibiana BURNS LPN, have performed the following reviews on Ladan Castillo prior to the start of the procedure:            * Patient was identified by name and date of birth   * Agreement on procedure being performed was verified  * Risks and Benefits explained to the patient  * Procedure site verified and marked as necessary  * Patient was positioned for comfort  * Consent was signed and verified     Time: 1005    Date of procedure: 11/14/2024    Procedure performed by:  Cheli Mojica MD       Provider assisted by: Bibiana Juarez LPN    Patient assisted by: self    How tolerated by patient: tolerated the procedure well with no complications    Post Procedural Pain Scale: 0 - No Hurt    Comments: none    Examination chaperoned by Bibiana Juarez LPN.

## 2024-11-14 NOTE — PROGRESS NOTES
[facility]  [phone]    Cheli Mojica MD, FACOG     OB/GYN OFFICE PROCEDURE PROGRESS NOTE    Procedure Note:  IUD removal    Chart reviewed for the following:   Cheli BURNS MD, have reviewed the History, Physical and updated the Allergic reactions for Ladan Castillo     TIME OUT performed immediately prior to start of procedure:   Cheli BURNS MD, have performed the following reviews on Ladan Castillo prior to the start of the procedure:            * Patient was identified by name and date of birth   * Agreement on procedure being performed was verified  * Risks and Benefits explained to the patient  * Procedure site verified and marked as necessary  * Patient was positioned for comfort  * Consent was signed and verified     Time: 10:09 AM      Date of procedure: 11/14/2024    Procedure performed by:  Cheli Mojica MD    Provider assisted by: Bibiana Juarez LPN    Patient assisted by: male partner    How tolerated by patient: tolerated the procedure well with no complications    Post Procedural Pain Scale: 2 - Hurts Little Bit    Comments: none      Procedure Note: IUD REMOVAL    Ladan Castillo is a No obstetric history on file.,  29 y.o. female White (non-) whose No LMP recorded. (Menstrual status: IUD).  was on . who presents today for IUD removal.   She premedicated for the procedure today and has her partner as a drive and chaperone.  See consent from 11/13/24      She requests removal of the IUD because the IUD because  she wants to get pregnant. The IUD removal procedure was discussed with the patient and she had no further questions.     Procedure: The patient was placed in a dorsal lithotomy position and appropriately draped.  A speculum exam was performed and the cervix was visualized. The cervix was prepped with zephiran solution. The IUD string was visualized. Using ring forceps , the string was grasped and the IUD removed intact. The IUD was

## 2025-01-02 ENCOUNTER — TELEPHONE (OUTPATIENT)
Age: 30
End: 2025-01-02

## 2025-01-02 RX ORDER — ONDANSETRON 4 MG/1
4 TABLET, ORALLY DISINTEGRATING ORAL EVERY 8 HOURS PRN
Qty: 30 TABLET | Refills: 0 | Status: SHIPPED | OUTPATIENT
Start: 2025-01-02

## 2025-01-02 RX ORDER — PROMETHAZINE HYDROCHLORIDE 25 MG/1
25 SUPPOSITORY RECTAL EVERY 6 HOURS PRN
Qty: 10 SUPPOSITORY | Refills: 3 | Status: SHIPPED | OUTPATIENT
Start: 2025-01-02

## 2025-01-02 NOTE — TELEPHONE ENCOUNTER
eRX sent for Zofran and Phenergan suppositories.  If these are ineffective then recommend that she report to [place] ED for IV fluids.

## 2025-01-02 NOTE — TELEPHONE ENCOUNTER
PT name and  verified    30 yo last ov 24, next ov/EOB 25  LMP 24(6w5d)    PT calling stating she has had some nausea and vomiting for the past week, but has gradually worsened, and has been vomiting and not being able to keep anything down for the past 2 days.  PT states she last ate yesterday 25 and had cheerios, threw that up, cannot keep liquid down, took Unisom last night for sleep and threw that up.  PT reports now she is just throwing up bile this am and she does not see MD until the .  PT did report she weighed herself this am and she weighed 136, and at her last ov  she weighed 148.  PT denies any other sx, no abdominal pain, no fever, has not been around anyone that has been sick.    RN consulted  and will send the message to her to review. RN updated PT's preferred pharmacy.    Please review  TP patient    Thank you

## 2025-01-02 NOTE — TELEPHONE ENCOUNTER
PT was called back, name and  verified    RN relayed MD message and recommendation:  Emmie Finley MD         25  9:19 AM  Note     eRX sent for Zofran and Phenergan suppositories.  If these are ineffective then recommend that she report to [facility] for IV fluids.      PT verbalizes understanding.

## 2025-01-07 DIAGNOSIS — Z34.01 ENCOUNTER FOR SUPERVISION OF NORMAL FIRST PREGNANCY IN FIRST TRIMESTER: Primary | ICD-10-CM

## 2025-01-13 ENCOUNTER — ROUTINE PRENATAL (OUTPATIENT)
Age: 30
End: 2025-01-13
Payer: COMMERCIAL

## 2025-01-13 VITALS — BODY MASS INDEX: 24.45 KG/M2 | WEIGHT: 138 LBS | DIASTOLIC BLOOD PRESSURE: 75 MMHG | SYSTOLIC BLOOD PRESSURE: 115 MMHG

## 2025-01-13 DIAGNOSIS — R11.2 NAUSEA AND VOMITING, UNSPECIFIED VOMITING TYPE: ICD-10-CM

## 2025-01-13 DIAGNOSIS — O36.80X0 PREGNANCY OF UNKNOWN ANATOMIC LOCATION: ICD-10-CM

## 2025-01-13 DIAGNOSIS — Z11.3 VENEREAL DISEASE SCREENING: ICD-10-CM

## 2025-01-13 DIAGNOSIS — Z32.01 POSITIVE PREGNANCY TEST: ICD-10-CM

## 2025-01-13 DIAGNOSIS — O36.80X0 PREGNANCY OF UNKNOWN ANATOMIC LOCATION: Primary | ICD-10-CM

## 2025-01-13 LAB
ABO + RH BLD: NORMAL
BLOOD BANK CMNT PATIENT-IMP: NORMAL
BLOOD GROUP ANTIBODIES SERPL: NORMAL
HCG, PREGNANCY, URINE, POC: POSITIVE
SPECIMEN EXP DATE BLD: NORMAL
VALID INTERNAL CONTROL, POC: YES

## 2025-01-13 PROCEDURE — 81025 URINE PREGNANCY TEST: CPT | Performed by: OBSTETRICS & GYNECOLOGY

## 2025-01-13 PROCEDURE — 99214 OFFICE O/P EST MOD 30 MIN: CPT | Performed by: OBSTETRICS & GYNECOLOGY

## 2025-01-13 PROCEDURE — 99459 PELVIC EXAMINATION: CPT | Performed by: OBSTETRICS & GYNECOLOGY

## 2025-01-13 RX ORDER — ONDANSETRON 4 MG/1
4 TABLET, ORALLY DISINTEGRATING ORAL EVERY 8 HOURS PRN
Qty: 30 TABLET | Refills: 0 | Status: SHIPPED | OUTPATIENT
Start: 2025-01-13

## 2025-01-13 NOTE — PROGRESS NOTES
Rooming note, gyn problem visit:    Chief Complaint   Patient presents with    Positive preg test     Ladan Castillo is a 29 y.o. female presents for a problem visit.    No LMP recorded. (Menstrual status: IUD).  Birth Control: none.    Last or next WWE is: 4/11/2024    The patient is reporting having: patient had cycle 11/16/2024 after having IUD removed 11/14/2024. Pt reports bleeding lasted two days and has not had any additional bleeding since. She states she took a pregnancy test 12/6 and it was positive  This is a new problem.  She has not experienced this problem before.    She reports the symptoms are is unchanged.  Aggravating factors include none.  And alleviating factors include none.    She does not have other concerns.      1. Have you been to the ER, urgent care clinic, or hospitalized since your last visit?{  no    2. Have you seen or consulted any other health care providers outside of the [facility] since your last visit?  no  
that does not improve with hydration or tylenol or other concerns.  Labs  Disc possible abnormal IUP vs dating issue.  Serial labs  Zofran refilled, notify MD if NI      Orders Placed This Encounter    Chlamydia, Gonorrhea, Trichomoniasis    CBC     Standing Status:   Future     Number of Occurrences:   1     Standing Expiration Date:   1/13/2026    HCG, Quantitative, Pregnancy     Standing Status:   Future     Number of Occurrences:   1     Standing Expiration Date:   1/13/2026    Progesterone     Standing Status:   Future     Number of Occurrences:   1     Standing Expiration Date:   1/13/2026    AMB POC URINE PREGNANCY TEST, VISUAL COLOR COMPARISON    Type and Screen     Standing Status:   Future     Number of Occurrences:   1     Standing Expiration Date:   1/13/2026    ondansetron (ZOFRAN-ODT) 4 MG disintegrating tablet     Sig: Take 1 tablet by mouth every 8 hours as needed for Nausea or Vomiting     Dispense:  30 tablet     Refill:  0       Results for orders placed or performed in visit on 01/13/25   AMB POC URINE PREGNANCY TEST, VISUAL COLOR COMPARISON   Result Value Ref Range    Valid Internal Control, POC yes     HCG, Pregnancy, Urine, POC Positive        No follow-ups on file.    A pelvic exam and associated supplies were necessary for evaluation of the patient for this type of visit.        Provider chaperoned and assisted by: Bibiana Juarez LPN for pelvic exam and additional supplies used for pelvic exam.

## 2025-01-15 ENCOUNTER — LAB (OUTPATIENT)
Age: 30
End: 2025-01-15

## 2025-01-15 DIAGNOSIS — O20.0 THREATENED ABORTION: Primary | ICD-10-CM

## 2025-01-15 LAB
ERYTHROCYTE [DISTWIDTH] IN BLOOD BY AUTOMATED COUNT: 11.5 % (ref 11.5–14.5)
HCG SERPL-ACNC: ABNORMAL MIU/ML (ref 0–6)
HCT VFR BLD AUTO: 40.2 % (ref 35–47)
HGB BLD-MCNC: 13.9 G/DL (ref 11.5–16)
MCH RBC QN AUTO: 32.6 PG (ref 26–34)
MCHC RBC AUTO-ENTMCNC: 34.6 G/DL (ref 30–36.5)
MCV RBC AUTO: 94.1 FL (ref 80–99)
NRBC # BLD: 0 K/UL (ref 0–0.01)
NRBC BLD-RTO: 0 PER 100 WBC
PLATELET # BLD AUTO: 329 K/UL (ref 150–400)
PMV BLD AUTO: 10.6 FL (ref 8.9–12.9)
PROGEST SERPL-MCNC: 8.9 NG/ML
RBC # BLD AUTO: 4.27 M/UL (ref 3.8–5.2)
WBC # BLD AUTO: 9.3 K/UL (ref 3.6–11)

## 2025-01-16 LAB
C TRACH RRNA SPEC QL NAA+PROBE: NEGATIVE
N GONORRHOEA RRNA SPEC QL NAA+PROBE: NEGATIVE
T VAGINALIS RRNA SPEC QL NAA+PROBE: NEGATIVE

## 2025-01-17 LAB — HCG SERPL-ACNC: ABNORMAL MIU/ML (ref 0–6)

## 2025-01-19 NOTE — RESULT ENCOUNTER NOTE
Abnormal results   message sent if active on MC.  Notify patient if not on MC or message not read within two weeks.  Call or send letter and document in chart.  Rec fu and US for viability ~1 week

## 2025-01-21 DIAGNOSIS — Z32.01 POSITIVE PREGNANCY TEST: ICD-10-CM

## 2025-01-21 DIAGNOSIS — O36.80X0 PREGNANCY OF UNKNOWN ANATOMIC LOCATION: Primary | ICD-10-CM

## 2025-01-22 ENCOUNTER — PREP FOR PROCEDURE (OUTPATIENT)
Facility: HOSPITAL | Age: 30
End: 2025-01-22

## 2025-01-22 ENCOUNTER — ANESTHESIA EVENT (OUTPATIENT)
Facility: HOSPITAL | Age: 30
End: 2025-01-22
Payer: COMMERCIAL

## 2025-01-22 ENCOUNTER — OFFICE VISIT (OUTPATIENT)
Age: 30
End: 2025-01-22
Payer: COMMERCIAL

## 2025-01-22 VITALS
HEART RATE: 112 BPM | SYSTOLIC BLOOD PRESSURE: 125 MMHG | DIASTOLIC BLOOD PRESSURE: 83 MMHG | BODY MASS INDEX: 24.3 KG/M2 | WEIGHT: 137.2 LBS

## 2025-01-22 DIAGNOSIS — O36.80X0 PREGNANCY OF UNKNOWN ANATOMIC LOCATION: Primary | ICD-10-CM

## 2025-01-22 DIAGNOSIS — O02.1 MISSED ABORTION: Primary | ICD-10-CM

## 2025-01-22 DIAGNOSIS — Z32.01 POSITIVE PREGNANCY TEST: ICD-10-CM

## 2025-01-22 DIAGNOSIS — O02.1 MISSED ABORTION: ICD-10-CM

## 2025-01-22 PROCEDURE — 99213 OFFICE O/P EST LOW 20 MIN: CPT | Performed by: OBSTETRICS & GYNECOLOGY

## 2025-01-22 RX ORDER — HYDROCODONE BITARTRATE AND ACETAMINOPHEN 5; 325 MG/1; MG/1
1 TABLET ORAL EVERY 6 HOURS PRN
Qty: 4 TABLET | Refills: 0 | Status: SHIPPED | OUTPATIENT
Start: 2025-01-22 | End: 2025-01-25

## 2025-01-22 RX ORDER — IBUPROFEN 800 MG/1
800 TABLET, FILM COATED ORAL EVERY 8 HOURS PRN
Qty: 20 TABLET | Refills: 0 | Status: SHIPPED | OUTPATIENT
Start: 2025-01-22 | End: 2025-02-01

## 2025-01-22 RX ORDER — METHYLERGONOVINE MALEATE 0.2 MG/1
0.2 TABLET ORAL 3 TIMES DAILY
Qty: 6 TABLET | Refills: 0 | Status: SHIPPED | OUTPATIENT
Start: 2025-01-22 | End: 2025-01-24

## 2025-01-22 NOTE — PROGRESS NOTES
[facility]  [email or web address]  [email or web address]  [phone]    Cheli Mojica MD, FACOG      OB/GYN Problem visit    HPI  Ladan Castillo is a , 29 y.o. female who presents for a problem visit.   Chief Complaint   Patient presents with    Consultation     Discuss betas    Ultrasound       Patient wanted sooner fu due to concerns for SAB, she did not want to wait until next week.    History of Present Illness  The patient presents for evaluation of a miscarriage.    She reports no current issues with nausea or bleeding. She has expressed a desire to terminate the pregnancy and has opted for a D and C procedure, citing concerns about potential prolonged bleeding associated with medication use.        Per Rooming Note:  Ladan Castillo is a 29 y.o. female presents for a problem visit.     Patient's last menstrual period was 2024 (exact date).  Birth Control: none.     Last or next WWE is: 2024     The patient is reporting having: follow up to discuss betas and ultrasound. Patient reports less nausea. She denies vaginal bleeding or abdominal cramping.   This is  a new problem.  She has not experienced this problem before.     She reports the symptoms are is unchanged.  Aggravating factors include none.  And alleviating factors include none.       Sexual history and Contraception:  Social History     Substance and Sexual Activity   Sexual Activity Yes    Partners: Male    Birth control/protection: I.U.D.       Past Medical History:   Diagnosis Date    Encounter for IUD insertion 2021    kyleena due out 3/18/2026    Encounter for IUD removal 2024    Pap smear for cervical cancer screening 2016; 21    Negative; neg    Pap smear for cervical cancer screening 2024    normal; HPV not tested         Current Outpatient Medications:     methylergonovine (METHERGINE) 0.2 MG tablet, Take 1 tablet

## 2025-01-22 NOTE — H&P
the History of Present Illness.     Objective:     Vitals:    25 0625 25 0637   BP:  116/77   Pulse:  89   Resp:  18   Temp:  98.7 °F (37.1 °C)   TempSrc:  Oral   SpO2:  99%   Weight: 63.2 kg (139 lb 5.3 oz)    Height: 1.6 m (5' 3\")        Physical Exam:  Patient without distress.  Heart: Regular rate and rhythm or S1S2 present  Lung: clear to auscultation throughout lung fields, no wheezes, no rales, no rhonchi and normal respiratory effort  Abdomen: soft, nontender  Extremities, lower: no c/t/e bilaterally  Pelvic exam: defferred to OR    Lab Results   Component Value Date/Time    WBC 9.3 2025 04:26 PM    RBC 4.27 2025 04:26 PM    HGB 13.9 2025 04:26 PM    HCT 40.2 2025 04:26 PM     2025 04:26 PM         Assessment:   29 y.o.   Rh negative  Missed  9w4d by LMP 6w3d by gestational sac/US dating    Past Medical History:   Diagnosis Date    Encounter for IUD insertion 2021    kyleena due out 3/18/2026    Encounter for IUD removal 2024    Pap smear for cervical cancer screening 2016; 21    Negative; neg    Pap smear for cervical cancer screening 2024    normal; HPV not tested       Plan:     Procedure(s) (LRB):  DILATATION AND CURETTAGE SUCTION (N/A)  Discussed the risks of surgery including the risks of bleeding, infection, deep vein thrombosis, scarring of the uterus and surgical injuries to internal organs including but not limited to the bowels, bladder, rectum, and female reproductive organs. The patient understands the risks; any and all questions were answered to the patient's satisfaction.  Patient desires surgical intervention.    All questions were answered.  Genetic testing not planned.  Doxycycline preop      Signed By:  Cheli Mojica MD     2025

## 2025-01-22 NOTE — PROGRESS NOTES
Rooming note, gyn problem visit:    Chief Complaint   Patient presents with    Consultation     Discuss betas     Ladan Castillo is a 29 y.o. female presents for a problem visit.    Patient's last menstrual period was 11/16/2024 (exact date).  Birth Control: none.    Last or next WWE is: 4/11/2024    The patient is reporting having: follow up to discuss betas and ultrasound. Patient reports less nausea. She denies vaginal bleeding or abdominal cramping.   This is  a new problem.  She has not experienced this problem before.    She reports the symptoms are is unchanged.  Aggravating factors include none.  And alleviating factors include none.    She does not have other concerns.      1. Have you been to the ER, urgent care clinic, or hospitalized since your last visit?{  no    2. Have you seen or consulted any other health care providers outside of the [facility] since your last visit?  no

## 2025-01-23 ENCOUNTER — ANESTHESIA (OUTPATIENT)
Facility: HOSPITAL | Age: 30
End: 2025-01-23
Payer: COMMERCIAL

## 2025-01-23 ENCOUNTER — HOSPITAL ENCOUNTER (OUTPATIENT)
Facility: HOSPITAL | Age: 30
Setting detail: OUTPATIENT SURGERY
Discharge: HOME | End: 2025-01-23
Attending: OBSTETRICS & GYNECOLOGY | Admitting: OBSTETRICS & GYNECOLOGY
Payer: COMMERCIAL

## 2025-01-23 VITALS
TEMPERATURE: 98.1 F | SYSTOLIC BLOOD PRESSURE: 111 MMHG | DIASTOLIC BLOOD PRESSURE: 58 MMHG | OXYGEN SATURATION: 99 % | WEIGHT: 139.33 LBS | RESPIRATION RATE: 20 BRPM | HEIGHT: 63 IN | HEART RATE: 67 BPM | BODY MASS INDEX: 24.69 KG/M2

## 2025-01-23 DIAGNOSIS — O02.1 MISSED ABORTION: ICD-10-CM

## 2025-01-23 LAB
ABO + RH BLD: NORMAL
BLOOD GROUP ANTIBODIES SERPL: NORMAL
SPECIMEN EXP DATE BLD: NORMAL

## 2025-01-23 PROCEDURE — 2709999900 HC NON-CHARGEABLE SUPPLY: Performed by: OBSTETRICS & GYNECOLOGY

## 2025-01-23 PROCEDURE — 6370000000 HC RX 637 (ALT 250 FOR IP): Performed by: OBSTETRICS & GYNECOLOGY

## 2025-01-23 PROCEDURE — 2500000003 HC RX 250 WO HCPCS: Performed by: NURSE ANESTHETIST, CERTIFIED REGISTERED

## 2025-01-23 PROCEDURE — 88305 TISSUE EXAM BY PATHOLOGIST: CPT

## 2025-01-23 PROCEDURE — 6360000002 HC RX W HCPCS: Performed by: NURSE ANESTHETIST, CERTIFIED REGISTERED

## 2025-01-23 PROCEDURE — 86850 RBC ANTIBODY SCREEN: CPT

## 2025-01-23 PROCEDURE — 2580000003 HC RX 258: Performed by: NURSE ANESTHETIST, CERTIFIED REGISTERED

## 2025-01-23 PROCEDURE — 3700000001 HC ADD 15 MINUTES (ANESTHESIA): Performed by: OBSTETRICS & GYNECOLOGY

## 2025-01-23 PROCEDURE — 3700000000 HC ANESTHESIA ATTENDED CARE: Performed by: OBSTETRICS & GYNECOLOGY

## 2025-01-23 PROCEDURE — 7100000011 HC PHASE II RECOVERY - ADDTL 15 MIN: Performed by: OBSTETRICS & GYNECOLOGY

## 2025-01-23 PROCEDURE — 86900 BLOOD TYPING SEROLOGIC ABO: CPT

## 2025-01-23 PROCEDURE — 3600000002 HC SURGERY LEVEL 2 BASE: Performed by: OBSTETRICS & GYNECOLOGY

## 2025-01-23 PROCEDURE — 7100000010 HC PHASE II RECOVERY - FIRST 15 MIN: Performed by: OBSTETRICS & GYNECOLOGY

## 2025-01-23 PROCEDURE — 3600000012 HC SURGERY LEVEL 2 ADDTL 15MIN: Performed by: OBSTETRICS & GYNECOLOGY

## 2025-01-23 PROCEDURE — 86901 BLOOD TYPING SEROLOGIC RH(D): CPT

## 2025-01-23 PROCEDURE — 36415 COLL VENOUS BLD VENIPUNCTURE: CPT

## 2025-01-23 RX ORDER — MIDAZOLAM HYDROCHLORIDE 1 MG/ML
INJECTION, SOLUTION INTRAMUSCULAR; INTRAVENOUS
Status: DISCONTINUED | OUTPATIENT
Start: 2025-01-23 | End: 2025-01-23 | Stop reason: SDUPTHER

## 2025-01-23 RX ORDER — DEXMEDETOMIDINE HYDROCHLORIDE 100 UG/ML
INJECTION, SOLUTION INTRAVENOUS
Status: DISCONTINUED | OUTPATIENT
Start: 2025-01-23 | End: 2025-01-23 | Stop reason: SDUPTHER

## 2025-01-23 RX ORDER — SODIUM CHLORIDE 9 MG/ML
INJECTION, SOLUTION INTRAVENOUS
Status: DISCONTINUED | OUTPATIENT
Start: 2025-01-23 | End: 2025-01-23 | Stop reason: SDUPTHER

## 2025-01-23 RX ORDER — DEXAMETHASONE SODIUM PHOSPHATE 4 MG/ML
INJECTION, SOLUTION INTRA-ARTICULAR; INTRALESIONAL; INTRAMUSCULAR; INTRAVENOUS; SOFT TISSUE
Status: DISCONTINUED | OUTPATIENT
Start: 2025-01-23 | End: 2025-01-23 | Stop reason: SDUPTHER

## 2025-01-23 RX ORDER — KETOROLAC TROMETHAMINE 30 MG/ML
INJECTION, SOLUTION INTRAMUSCULAR; INTRAVENOUS
Status: DISCONTINUED | OUTPATIENT
Start: 2025-01-23 | End: 2025-01-23 | Stop reason: SDUPTHER

## 2025-01-23 RX ORDER — ONDANSETRON 2 MG/ML
INJECTION INTRAMUSCULAR; INTRAVENOUS
Status: DISCONTINUED | OUTPATIENT
Start: 2025-01-23 | End: 2025-01-23 | Stop reason: SDUPTHER

## 2025-01-23 RX ORDER — PROPOFOL 10 MG/ML
INJECTION, EMULSION INTRAVENOUS
Status: DISCONTINUED | OUTPATIENT
Start: 2025-01-23 | End: 2025-01-23 | Stop reason: SDUPTHER

## 2025-01-23 RX ORDER — FENTANYL CITRATE 50 UG/ML
INJECTION, SOLUTION INTRAMUSCULAR; INTRAVENOUS
Status: DISCONTINUED | OUTPATIENT
Start: 2025-01-23 | End: 2025-01-23 | Stop reason: SDUPTHER

## 2025-01-23 RX ORDER — DOXYCYCLINE HYCLATE 100 MG
200 TABLET ORAL
Status: COMPLETED | OUTPATIENT
Start: 2025-01-23 | End: 2025-01-23

## 2025-01-23 RX ORDER — MULTIVITAMIN WITH IRON
1 TABLET ORAL DAILY
Status: ON HOLD | COMMUNITY
End: 2025-01-23 | Stop reason: HOSPADM

## 2025-01-23 RX ADMIN — MIDAZOLAM HYDROCHLORIDE 2 MG: 1 INJECTION, SOLUTION INTRAMUSCULAR; INTRAVENOUS at 07:54

## 2025-01-23 RX ADMIN — PROPOFOL 20 MG: 10 INJECTION, EMULSION INTRAVENOUS at 08:28

## 2025-01-23 RX ADMIN — PROPOFOL 20 MG: 10 INJECTION, EMULSION INTRAVENOUS at 08:02

## 2025-01-23 RX ADMIN — DEXMEDETOMIDINE 2 MCG: 100 INJECTION, SOLUTION INTRAVENOUS at 08:29

## 2025-01-23 RX ADMIN — DEXAMETHASONE SODIUM PHOSPHATE 4 MG: 4 INJECTION INTRA-ARTICULAR; INTRALESIONAL; INTRAMUSCULAR; INTRAVENOUS; SOFT TISSUE at 08:10

## 2025-01-23 RX ADMIN — ONDANSETRON 4 MG: 2 INJECTION, SOLUTION INTRAMUSCULAR; INTRAVENOUS at 07:46

## 2025-01-23 RX ADMIN — PROPOFOL 20 MG: 10 INJECTION, EMULSION INTRAVENOUS at 08:04

## 2025-01-23 RX ADMIN — DEXMEDETOMIDINE 4 MCG: 100 INJECTION, SOLUTION INTRAVENOUS at 07:59

## 2025-01-23 RX ADMIN — PROPOFOL 100 MCG/KG/MIN: 10 INJECTION, EMULSION INTRAVENOUS at 08:01

## 2025-01-23 RX ADMIN — KETOROLAC TROMETHAMINE 30 MG: 30 INJECTION, SOLUTION INTRAMUSCULAR at 08:34

## 2025-01-23 RX ADMIN — FENTANYL CITRATE 25 MCG: 50 INJECTION, SOLUTION INTRAMUSCULAR; INTRAVENOUS at 07:56

## 2025-01-23 RX ADMIN — SODIUM CHLORIDE: 9 INJECTION, SOLUTION INTRAVENOUS at 07:15

## 2025-01-23 RX ADMIN — FENTANYL CITRATE 25 MCG: 50 INJECTION, SOLUTION INTRAMUSCULAR; INTRAVENOUS at 08:27

## 2025-01-23 RX ADMIN — DOXYCYCLINE HYCLATE 200 MG: 100 TABLET, COATED ORAL at 07:44

## 2025-01-23 ASSESSMENT — PAIN SCALES - GENERAL: PAINLEVEL_OUTOF10: 0

## 2025-01-23 ASSESSMENT — PAIN - FUNCTIONAL ASSESSMENT: PAIN_FUNCTIONAL_ASSESSMENT: 0-10

## 2025-01-23 NOTE — OP NOTE
SUCTION CURETTAGE FULL OP NOTE    Ladan Castillo  [id number]  1995      DATE OF PROCEDURE:  2025      PREOPERATIVE DIAGNOSIS:  Missed  [O02.1]  6w 3d by gestation sac    POSTOPERATIVE DIAGNOSIS:  same    PROCEDURE: Procedure(s):  DILATATION AND CURETTAGE SUCTION     SURGEON:  Cheli Mojica MD    ASSISTANT: OR staff    ANESTHESIA:general    EBL: less than 50 ml    Urine output:     IVF: per anesthesia    Complications: none    SPECIMENS: Products of conception    FINDINGS: Slightly enlarged uterus, cervix closed, no blood in vault    PROCEDURE: Patient was placed on the operating table in the supine position and after induction of anesthesia she was placed in the dorsal lithotomy position and prepped and draped in the usual fashion for vaginal surgery. The bladder was drained prior to walking to OR. Cervix was exposed with a bivalve vaginal speculum and grasped with a single-tooth tenaculum.   It was necessary to serially dilate the cervix in order to introduce a straight   7 suction curette device was then introduced into the endometrial cavity after it was sounded to approximately 8 cm. Thorough suction curettage followed by sharp curettage with a large curette followed again by suction curettage was performed until the suction returned no further clot or products of conception. Adequate curettage was felt to be obtained. The uterus was massaged. Hemostasis appeared normal, Instruments were removed. The patient went to the recovery room in satisfactory condition.  All counts were correct times two.  Of note blood type was RH negative.  Rhogam was ordered.

## 2025-01-23 NOTE — ANESTHESIA PRE PROCEDURE
11.5 01/13/2025 04:26 PM     01/13/2025 04:26 PM       CMP: No results found for: \"NA\", \"K\", \"CL\", \"CO2\", \"BUN\", \"CREATININE\", \"GFRAA\", \"AGRATIO\", \"LABGLOM\", \"GLUCOSE\", \"GLU\", \"CALCIUM\", \"BILITOT\", \"ALKPHOS\", \"AST\", \"ALT\"    POC Tests: No results for input(s): \"POCGLU\", \"POCNA\", \"POCK\", \"POCCL\", \"POCBUN\", \"POCHEMO\", \"POCHCT\" in the last 72 hours.    Coags: No results found for: \"PROTIME\", \"INR\", \"APTT\"    HCG (If Applicable):   Lab Results   Component Value Date    HCGQUANT 24,873 (H) 01/15/2025        ABGs: No results found for: \"PHART\", \"PO2ART\", \"UWL9YGU\", \"QOW9OZS\", \"BEART\", \"C6MWXEHI\"     Type & Screen (If Applicable):  Lab Results   Component Value Date    ABORH O NEGATIVE 01/13/2025    LABANTI NEG 01/13/2025       Drug/Infectious Status (If Applicable):  Lab Results   Component Value Date/Time    HEPCAB <0.1 04/18/2022 12:00 AM       COVID-19 Screening (If Applicable): No results found for: \"COVID19\"        Anesthesia Evaluation  Patient summary reviewed and Nursing notes reviewed  Airway: Mallampati: I          Dental: normal exam         Pulmonary:Negative Pulmonary ROS and normal exam                               Cardiovascular:  Exercise tolerance: good (>4 METS)          Rhythm: regular  Rate: normal                    Neuro/Psych:   Negative Neuro/Psych ROS              GI/Hepatic/Renal: Neg GI/Hepatic/Renal ROS            Endo/Other: Negative Endo/Other ROS                    Abdominal: normal exam            Vascular: negative vascular ROS.         Other Findings:       Anesthesia Plan      MAC     ASA 1       Induction: intravenous.      Anesthetic plan and risks discussed with patient.                    Jose Alvarez MD   1/23/2025

## 2025-01-23 NOTE — BRIEF OP NOTE
Brief Postoperative Note      Patient: Ladan Castillo  YOB: 1995  MRN: [id number]    Date of Procedure: 2025    Pre-Op Diagnosis Codes:      * Missed  [O02.1]    Post-Op Diagnosis: Same       Procedure(s):  DILATATION AND CURETTAGE SUCTION    Surgeon(s):  Cheli Mojica MD    Assistant:  * No surgical staff found *    Anesthesia: General    Estimated Blood Loss (mL): less than 50     Complications: None    Specimens:   * No specimens in log *    Implants:  * No implants in log *      Drains: * No LDAs found *    Findings:  Infection Present At Time Of Surgery (PATOS) (choose all levels that have infection present):  No infection present  Other Findings: closed cervix    Electronically signed by Cheli Mojica MD on 2025 at 7:46 AM

## 2025-01-23 NOTE — DISCHARGE INSTRUCTIONS
[email or web address]  Current as of: April 1, 2020               Content Version: 12.4  © 6933-1401 [employer].   Care instructions adapted under license by your healthcare professional. If you have questions about a medical condition or this instruction, always ask your healthcare professional. [employer] disclaims any warranty or liability for your use of this information.    The discharge information has been reviewed with the patient and spouse.    Any questions and concerns from the patient and spouse have been addressed.  The patient and spouse verbalized understanding.        CONTENTS FOUND IN YOUR DISCHARGE ENVELOPE:  [x]     Surgeon and Hospital Discharge Instructions  []     [facility] Care Provider Card  []     Medication & Side Effect Guide            (your newly prescribed medications have been marked/highlighted showing the most common side effects from   the classes of drugs on your prescriptions)  [x]     Medication Prescription(s) x 1 ( [x] These have been sent electronically to your pharmacy by your surgeon,   - OR -       your surgeon has already provided these to you during a previous/pre-op office visit)  []     [facility] Education Information  []     Physical Therapy Prescription  []     Follow-up Appointment Cards  []     Surgery-related Pictures/Media  []     Pain block and/or block with On-Q Catheter from Anesthesia Service (information included in your instructions above)  []     Medical device information sheets/pamphlets from their    []     School/work excuse note.  []     /parent work excuse note.

## 2025-01-23 NOTE — ANESTHESIA POSTPROCEDURE EVALUATION
Department of Anesthesiology  Postprocedure Note    Patient: Ladan Castillo  MRN: [id number]  YOB: 1995  Date of evaluation: 2025    Procedure Summary       Date: 25 Room / Location: [facility] MAIN OR  / [facility] MAIN OR    Anesthesia Start: 0754 Anesthesia Stop: 0854    Procedure: DILATATION AND CURETTAGE SUCTION (Uterus) Diagnosis:       Missed       (Missed  [O02.1])    Surgeons: Cheli Mojica MD Responsible Provider: Jose Alvarez MD    Anesthesia Type: MAC ASA Status: 1            Anesthesia Type: MAC    Star Phase I: Star Score: 10    Star Phase II: Star Score: 8    Anesthesia Post Evaluation    Patient location during evaluation: PACU  Patient participation: complete - patient participated  Level of consciousness: awake  Airway patency: patent  Nausea & Vomiting: no vomiting and no nausea  Cardiovascular status: hemodynamically stable  Respiratory status: acceptable  Hydration status: stable  Pain management: adequate    No notable events documented.

## 2025-02-24 ENCOUNTER — OFFICE VISIT (OUTPATIENT)
Age: 30
End: 2025-02-24
Payer: COMMERCIAL

## 2025-02-24 VITALS — HEART RATE: 67 BPM | TEMPERATURE: 98.5 F | SYSTOLIC BLOOD PRESSURE: 130 MMHG | DIASTOLIC BLOOD PRESSURE: 81 MMHG

## 2025-02-24 DIAGNOSIS — O02.1 MISSED ABORTION: Primary | ICD-10-CM

## 2025-02-24 DIAGNOSIS — Z30.09 FAMILY PLANNING: ICD-10-CM

## 2025-02-24 LAB
HCG, PREGNANCY, URINE, POC: POSITIVE
VALID INTERNAL CONTROL, POC: YES

## 2025-02-24 PROCEDURE — 99212 OFFICE O/P EST SF 10 MIN: CPT | Performed by: OBSTETRICS & GYNECOLOGY

## 2025-02-24 PROCEDURE — 81025 URINE PREGNANCY TEST: CPT | Performed by: OBSTETRICS & GYNECOLOGY

## 2025-02-24 NOTE — PROGRESS NOTES
[facility]  [email or web address]  [email or web address]  [phone]    Cheli Mojica MD, FACOG      OB/GYN Problem visit    HPI  Ladan Castillo is a , 29 y.o. female who presents for a problem visit.   Chief Complaint   Patient presents with    Follow Up After D&C       History of Present Illness  The patient presents for evaluation of post-surgical bleeding.    She reports experiencing bleeding on the day of her surgery, which was followed by spotting the subsequent day. She has not had any recent episodes of bleeding or cramping. A pregnancy test conducted yesterday yielded a positive result. She expresses a desire to conceive again and inquires about the possibility of having her progesterone and beta-hCG levels drawn in the future.    Otherwise feeling good after surgery.    Per Rooming Note:  The patient is reporting having: had D&C 2025. Pt reports UPT at home yesterday was postive. Pt bled for the first day after D&C, none since, no cramping or pelvic pain.      This is not a new problem.  She has experienced this problem before.     She reports the symptoms are is unchanged.  Aggravating factors include none.  And alleviating factors include none.     She does not have other concerns.    Sexual history and Contraception:        Past Medical History:   Diagnosis Date    Encounter for IUD insertion 2021    kyleena due out 3/18/2026    Encounter for IUD removal 2024    Pap smear for cervical cancer screening 2016; 21    Negative; neg    Pap smear for cervical cancer screening 2024    normal; HPV not tested         Current Outpatient Medications:     Prenatal Vit-Fe Fumarate-FA (PRENATAL PO), Take by mouth, Disp: , Rfl:     Past Surgical History:   Procedure Laterality Date    DILATION AND CURETTAGE OF UTERUS N/A 2025    Pathology: POC/chorionic villi  DILATATION AND CURETTAGE SUCTION

## 2025-02-24 NOTE — PROGRESS NOTES
]Rooming note, gyn problem visit:    Chief Complaint   Patient presents with    Follow Up After D&C     Ladan Castillo is a 29 y.o. female presents for a problem visit.    No LMP recorded.  Birth Control: none.    Last or next WWE is: 04/11/2024    The patient is reporting having: had D&C 01/23/2025. Pt reports UPT at home yesterday was postive. Pt bled for the first day after D&C, none since, no cramping or pelvic pain.     This is not a new problem.  She has experienced this problem before.    She reports the symptoms are is unchanged.  Aggravating factors include none.  And alleviating factors include none.    She does not have other concerns.

## 2025-03-12 ENCOUNTER — RESULTS FOLLOW-UP (OUTPATIENT)
Age: 30
End: 2025-03-12

## 2025-03-13 NOTE — RESULT ENCOUNTER NOTE
Abnormal results   message sent if active on MC.  Notify patient if not on MC or message not read within two weeks.  Call or send letter and document in chart.  Repeat hcg q week until <5, tickle

## 2025-03-19 ENCOUNTER — LAB (OUTPATIENT)
Age: 30
End: 2025-03-19

## 2025-03-19 DIAGNOSIS — R79.89 ELEVATED SERUM HCG: ICD-10-CM

## 2025-03-19 DIAGNOSIS — O03.9 MISCARRIAGE: Primary | ICD-10-CM

## 2025-03-20 LAB — HCG SERPL-ACNC: 11 MIU/ML (ref 0–6)

## 2025-03-22 ENCOUNTER — RESULTS FOLLOW-UP (OUTPATIENT)
Age: 30
End: 2025-03-22

## 2025-04-04 ENCOUNTER — LAB (OUTPATIENT)
Age: 30
End: 2025-04-04

## 2025-04-04 DIAGNOSIS — O03.9 MISCARRIAGE: Primary | ICD-10-CM

## 2025-04-04 LAB — HCG SERPL-ACNC: 2 MIU/ML (ref 0–6)

## 2025-04-06 ENCOUNTER — RESULTS FOLLOW-UP (OUTPATIENT)
Age: 30
End: 2025-04-06

## 2025-07-24 ENCOUNTER — OFFICE VISIT (OUTPATIENT)
Age: 30
End: 2025-07-24
Payer: COMMERCIAL

## 2025-07-24 ENCOUNTER — TELEPHONE (OUTPATIENT)
Age: 30
End: 2025-07-24

## 2025-07-24 VITALS
BODY MASS INDEX: 25.48 KG/M2 | HEART RATE: 88 BPM | HEIGHT: 63 IN | SYSTOLIC BLOOD PRESSURE: 117 MMHG | WEIGHT: 143.8 LBS | DIASTOLIC BLOOD PRESSURE: 79 MMHG

## 2025-07-24 DIAGNOSIS — O20.9 VAGINAL BLEEDING AFFECTING EARLY PREGNANCY: Primary | ICD-10-CM

## 2025-07-24 DIAGNOSIS — O20.9 BLEEDING IN EARLY PREGNANCY: Primary | ICD-10-CM

## 2025-07-24 PROCEDURE — 99213 OFFICE O/P EST LOW 20 MIN: CPT | Performed by: OBSTETRICS & GYNECOLOGY

## 2025-07-24 PROCEDURE — 96372 THER/PROPH/DIAG INJ SC/IM: CPT | Performed by: OBSTETRICS & GYNECOLOGY

## 2025-07-24 RX ORDER — PNV NO.95/FERROUS FUM/FOLIC AC 28MG-0.8MG
1 TABLET ORAL DAILY
Qty: 90 TABLET | Refills: 5 | Status: SHIPPED | OUTPATIENT
Start: 2025-07-24

## 2025-07-24 NOTE — PROGRESS NOTES
History:  Ladan Castillo is a 30 y.o. year old  White (non-) female   Patient's last menstrual period was 2025 (exact date).    She presents for problem visit  Nurse Notes:     Patient's last menstrual period was 2025 (exact date).  Birth Control: none.  Last Pap: normal obtained 2024     The patient presents with brown bleeding in underwear this am and when urinating      -------------------------------------------------------------------------------------------------------------------    History of Present Illness  The patient presents for evaluation of pregnancy.    She reports noticing a small amount of brown blood in her underwear upon waking up to use the bathroom. The bleeding was also present when she urinated and wiped, but it has since improved slightly. However, she still observes a minimal amount of blood each time she wipes. Her last pregnancy resulted in SAB. She is Rh-negative and has Zofran available from her previous pregnancy.    Family History   Problem Relation Age of Onset    Hypertension Father     Diabetes Father     No Known Problems Mother        Past Medical History:   Diagnosis Date    Encounter for IUD insertion 2021    kyleena due out 3/18/2026    Encounter for IUD removal 2024    Pap smear for cervical cancer screening 2016; 21    Negative; neg    Pap smear for cervical cancer screening 2024    normal; HPV not tested     Past Surgical History:   Procedure Laterality Date    DILATION AND CURETTAGE OF UTERUS N/A 2025    Pathology: POC/chorionic villi  DILATATION AND CURETTAGE SUCTION performed by Cheli Mojica MD at [facility] MAIN OR       Current Outpatient Medications   Medication Sig Dispense Refill    Prenatal Vit-Fe Fumarate-FA (PRENATAL VITAMIN) 27-0.8 MG TABS Take 1 tablet by mouth daily 90 tablet 5    Prenatal Vit-Fe Fumarate-FA (PRENATAL PO) Take by mouth       No current facility-administered medications

## 2025-07-24 NOTE — PROGRESS NOTES
Ladan Castillo is a 30 y.o. female presents for a problem visit.    No chief complaint on file.    Patient's last menstrual period was 06/13/2025 (exact date).  Birth Control: none.  Last Pap: normal obtained 4/11/2024    The patient presents with brown bleeding in underwear this am and when urinating     Zachary Jackson LPN administered Rhogam       1. Have you been to the ER, urgent care clinic, or hospitalized since your last visit? No    2. Have you seen or consulted any other health care providers outside of the [facility] since your last visit? No    Examination chaperoned by Lokesh Atwood MA.

## 2025-07-24 NOTE — PROGRESS NOTES
Ladan Castillo is a 30 y.o. female presents for a new pregnancy visit.    No chief complaint on file.      Problems: {Gyn Problem List:88510}     No LMP recorded.    Last Pap: see report obtained 1 year(s) ago.    LMP history:  The date of her LMP is *** certain.  Her last menstrual period was normal and lasted for 4 to 5 days.  A urine pregnancy test was positive *** weeks ago. She was not on the pill at conception.     Based on her LMP, her EDC is *** and her EGA is *** weeks,*** days. Her menstrual cycles are regular and occur approximately every 28 days and range from 3 to 5 days. The last menses lasted *** the usual number of days.      Ultrasound data:  She had an ultrasound done by the ultrasound tech today which revealed a viable franklin pregnancy with a gestational age of *** weeks and *** days giving an EDC of ***.    Pregnancy symptoms:    Since her LMP she has experienced urinary frequency, breast tenderness, and nausea.   She {has/has not:14929} been vomiting over the last few weeks.  Associated signs and symptoms which she denies: dysuria, discharge, vaginal bleeding.    She states she has gained weight:  Approximately 5 pounds over the last few weeks.    Relevant past pregnancy history:   She has the following pregnancy history: ***    She has no history of  delivery.    Relevant past medical history:(relevant to this pregnancy): noncontributory.      Her occupation is: ***.     1. Have you been to the ER, urgent care clinic, or hospitalized since your last visit? {YES/NO:::\"Yes- When: ***. Where: ***.  Reason for visit: ***\"}    2. Have you seen or consulted any other health care providers outside of the [facility] since your last visit? {YES/NO:::\"Yes- When: ***. Where: ***.  Reason for visit: ***\"}    Examination chaperoned by HEIDI HOLBROOK CMA.

## 2025-07-24 NOTE — TELEPHONE ENCOUNTER
Two patient identifiers used      30 year old patient last seen in the office on 1/22/2025 , previous TP patient and had D&C on 1/23/2025.    Patient calling to report her LMP is 6/13/2025 ( 5w6d pregnant) . Patient has first ob visit and ultrasound on 8/11/2025 with .      Patient calling to discuss that she woke up to brown discharge in her underwear and cramping at 4 on the pain scale of 1-10.    Patient also reports brown spotting with wiping as well. Patient denies recent intercourse ,    Patient is drinking water.    Patient reports recent loss 2/2025.    Patient was placed on the scheduled for ultrasound at 12:30 pm and to see MD at 1:00 pm per TH    Patient blood type is O negative    Patient verbalized understanding.  Ultrasound order pended for MD to review and sign

## 2025-07-25 LAB
ABO + RH BLD: NORMAL
BLOOD BANK CMNT PATIENT-IMP: NORMAL
BLOOD GROUP ANTIBODIES SERPL: NORMAL
SPECIMEN EXP DATE BLD: NORMAL

## 2025-07-30 DIAGNOSIS — Z34.90 PREGNANCY, UNSPECIFIED GESTATIONAL AGE: Primary | ICD-10-CM

## 2025-07-31 ENCOUNTER — ROUTINE PRENATAL (OUTPATIENT)
Age: 30
End: 2025-07-31

## 2025-07-31 VITALS
WEIGHT: 142.4 LBS | DIASTOLIC BLOOD PRESSURE: 77 MMHG | HEART RATE: 83 BPM | HEIGHT: 63 IN | SYSTOLIC BLOOD PRESSURE: 116 MMHG | BODY MASS INDEX: 25.23 KG/M2

## 2025-07-31 DIAGNOSIS — O09.90 SUPERVISION OF HIGH RISK PREGNANCY, ANTEPARTUM: Primary | ICD-10-CM

## 2025-07-31 DIAGNOSIS — Z3A.01 6 WEEKS GESTATION OF PREGNANCY: ICD-10-CM

## 2025-07-31 DIAGNOSIS — O30.039 MONOCHORIONIC DIAMNIOTIC TWIN PREGNANCY, ANTEPARTUM: ICD-10-CM

## 2025-07-31 PROCEDURE — 0501F PRENATAL FLOW SHEET: CPT | Performed by: OBSTETRICS & GYNECOLOGY

## 2025-07-31 SDOH — ECONOMIC STABILITY: FOOD INSECURITY: WITHIN THE PAST 12 MONTHS, THE FOOD YOU BOUGHT JUST DIDN'T LAST AND YOU DIDN'T HAVE MONEY TO GET MORE.: NEVER TRUE

## 2025-07-31 SDOH — ECONOMIC STABILITY: FOOD INSECURITY: WITHIN THE PAST 12 MONTHS, YOU WORRIED THAT YOUR FOOD WOULD RUN OUT BEFORE YOU GOT MONEY TO BUY MORE.: NEVER TRUE

## 2025-07-31 ASSESSMENT — PATIENT HEALTH QUESTIONNAIRE - PHQ9
SUM OF ALL RESPONSES TO PHQ QUESTIONS 1-9: 0
SUM OF ALL RESPONSES TO PHQ QUESTIONS 1-9: 0
1. LITTLE INTEREST OR PLEASURE IN DOING THINGS: NOT AT ALL
SUM OF ALL RESPONSES TO PHQ QUESTIONS 1-9: 0
2. FEELING DOWN, DEPRESSED OR HOPELESS: NOT AT ALL
SUM OF ALL RESPONSES TO PHQ QUESTIONS 1-9: 0

## 2025-08-01 ENCOUNTER — RESULTS FOLLOW-UP (OUTPATIENT)
Age: 30
End: 2025-08-01

## 2025-08-01 DIAGNOSIS — O09.90 SUPERVISION OF HIGH RISK PREGNANCY, ANTEPARTUM: Primary | ICD-10-CM

## 2025-08-01 LAB
ALBUMIN SERPL-MCNC: 4.8 G/DL (ref 4–5)
ALP SERPL-CCNC: 50 IU/L (ref 44–121)
ALT SERPL-CCNC: 14 IU/L (ref 0–32)
AST SERPL-CCNC: 20 IU/L (ref 0–40)
BILIRUB SERPL-MCNC: 0.4 MG/DL (ref 0–1.2)
BUN SERPL-MCNC: 9 MG/DL (ref 6–20)
BUN/CREAT SERPL: 16 (ref 9–23)
CALCIUM SERPL-MCNC: 9.8 MG/DL (ref 8.7–10.2)
CHLORIDE SERPL-SCNC: 101 MMOL/L (ref 96–106)
CO2 SERPL-SCNC: 21 MMOL/L (ref 20–29)
CREAT SERPL-MCNC: 0.56 MG/DL (ref 0.57–1)
EGFRCR SERPLBLD CKD-EPI 2021: 126 ML/MIN/1.73
ERYTHROCYTE [DISTWIDTH] IN BLOOD BY AUTOMATED COUNT: 11.7 % (ref 11.7–15.4)
GLOBULIN SER CALC-MCNC: 2.4 G/DL (ref 1.5–4.5)
GLUCOSE SERPL-MCNC: 80 MG/DL (ref 70–99)
HBV SURFACE AG SERPL QL IA: NEGATIVE
HCT VFR BLD AUTO: 38 % (ref 34–46.6)
HCV IGG SERPL QL IA: NON REACTIVE
HGB BLD-MCNC: 13 G/DL (ref 11.1–15.9)
HIV 1+2 AB+HIV1 P24 AG SERPL QL IA: NON REACTIVE
MCH RBC QN AUTO: 32.8 PG (ref 26.6–33)
MCHC RBC AUTO-ENTMCNC: 34.2 G/DL (ref 31.5–35.7)
MCV RBC AUTO: 96 FL (ref 79–97)
MEV IGG SER IA-ACNC: >300 AU/ML
PLATELET # BLD AUTO: 279 X10E3/UL (ref 150–450)
POTASSIUM SERPL-SCNC: 4.2 MMOL/L (ref 3.5–5.2)
PROT SERPL-MCNC: 7.2 G/DL (ref 6–8.5)
RBC # BLD AUTO: 3.96 X10E6/UL (ref 3.77–5.28)
RUBV IGG SERPL IA-ACNC: 4.55 INDEX
SODIUM SERPL-SCNC: 137 MMOL/L (ref 134–144)
VZV IGG SER QL IA: REACTIVE
WBC # BLD AUTO: 8.3 X10E3/UL (ref 3.4–10.8)

## 2025-08-02 LAB
BACTERIA UR CULT: NO GROWTH
CREAT UR-MCNC: 11.1 MG/DL
PROT UR-MCNC: <4 MG/DL
PROT/CREAT UR: ABNORMAL MG/G CREAT (ref 0–200)
TREPONEMA PALLIDUM IGG+IGM AB [PRESENCE] IN SERUM OR PLASMA BY IMMUNOASSAY: NON REACTIVE

## 2025-08-03 PROBLEM — O09.90 SUPERVISION OF HIGH RISK PREGNANCY, ANTEPARTUM: Status: ACTIVE | Noted: 2025-08-03

## 2025-08-04 DIAGNOSIS — O21.9 NAUSEA AND VOMITING OF PREGNANCY, ANTEPARTUM: Primary | ICD-10-CM

## 2025-08-04 RX ORDER — DOXYLAMINE SUCCINATE AND PYRIDOXINE HYDROCHLORIDE 20; 20 MG/1; MG/1
1 TABLET, EXTENDED RELEASE ORAL EVERY 12 HOURS
Qty: 60 TABLET | Refills: 5 | Status: SHIPPED | OUTPATIENT
Start: 2025-08-04

## 2025-08-04 RX ORDER — ONDANSETRON 8 MG/1
8 TABLET, ORALLY DISINTEGRATING ORAL EVERY 8 HOURS PRN
Qty: 30 TABLET | Refills: 5 | Status: SHIPPED | OUTPATIENT
Start: 2025-08-04

## 2025-08-29 ENCOUNTER — ROUTINE PRENATAL (OUTPATIENT)
Age: 30
End: 2025-08-29

## 2025-08-29 VITALS — WEIGHT: 134.4 LBS | DIASTOLIC BLOOD PRESSURE: 80 MMHG | SYSTOLIC BLOOD PRESSURE: 118 MMHG | BODY MASS INDEX: 23.81 KG/M2

## 2025-08-29 DIAGNOSIS — Z3A.11 11 WEEKS GESTATION OF PREGNANCY: ICD-10-CM

## 2025-08-29 DIAGNOSIS — O30.039 MONOCHORIONIC DIAMNIOTIC TWIN PREGNANCY, ANTEPARTUM: ICD-10-CM

## 2025-08-29 DIAGNOSIS — Z34.90 PREGNANCY, UNSPECIFIED GESTATIONAL AGE: Primary | ICD-10-CM

## 2025-08-29 DIAGNOSIS — O09.90 SUPERVISION OF HIGH RISK PREGNANCY, ANTEPARTUM: Primary | ICD-10-CM

## 2025-08-29 PROCEDURE — 0502F SUBSEQUENT PRENATAL CARE: CPT | Performed by: OBSTETRICS & GYNECOLOGY

## 2025-09-02 DIAGNOSIS — O21.9 NAUSEA AND VOMITING DURING PREGNANCY: Primary | ICD-10-CM

## 2025-09-02 RX ORDER — DOXYLAMINE SUCCINATE AND PYRIDOXINE HYDROCHLORIDE, DELAYED RELEASE TABLETS 10 MG/10 MG 10; 10 MG/1; MG/1
2 TABLET, DELAYED RELEASE ORAL NIGHTLY
Qty: 120 TABLET | Refills: 3 | Status: SHIPPED | OUTPATIENT
Start: 2025-09-02

## 2025-09-03 ENCOUNTER — ROUTINE PRENATAL (OUTPATIENT)
Age: 30
End: 2025-09-03

## 2025-09-03 VITALS — HEART RATE: 84 BPM | DIASTOLIC BLOOD PRESSURE: 72 MMHG | SYSTOLIC BLOOD PRESSURE: 109 MMHG

## 2025-09-03 DIAGNOSIS — O30.031 MONOCHORIONIC DIAMNIOTIC TWIN GESTATION IN FIRST TRIMESTER: Primary | ICD-10-CM

## 2025-09-03 DIAGNOSIS — Z34.90 PREGNANCY, UNSPECIFIED GESTATIONAL AGE: ICD-10-CM

## (undated) DEVICE — PERI GYN-SFMC: Brand: MEDLINE INDUSTRIES, INC.

## (undated) DEVICE — SET COLL TBNG L6FT DIA3/8IN W/ INTEGR SWVL HNDL SLIP RNG M

## (undated) DEVICE — SOLUTION IRRIG 1000ML 0.9% SOD CHL USP POUR PLAS BTL

## (undated) DEVICE — CURETTE VAC 7MM PLAS RIG STR ST

## (undated) DEVICE — SOLUTION IRRIG 1000ML STRL H2O USP PLAS POUR BTL

## (undated) DEVICE — SYSTEM COLL W/ TISS TRAP INCLUDE COLL CANSTR LID SET OF

## (undated) DEVICE — GLOVE SURG SZ 6 THK91MIL LTX FREE SYN POLYISOPRENE ANTI